# Patient Record
Sex: FEMALE | Race: WHITE | HISPANIC OR LATINO | Employment: FULL TIME | URBAN - METROPOLITAN AREA
[De-identification: names, ages, dates, MRNs, and addresses within clinical notes are randomized per-mention and may not be internally consistent; named-entity substitution may affect disease eponyms.]

---

## 2019-05-09 ENCOUNTER — OFFICE VISIT (OUTPATIENT)
Dept: INTERNAL MEDICINE | Facility: CLINIC | Age: 34
End: 2019-05-09
Payer: COMMERCIAL

## 2019-05-09 VITALS
BODY MASS INDEX: 38.95 KG/M2 | OXYGEN SATURATION: 98 % | HEART RATE: 73 BPM | TEMPERATURE: 98.6 F | DIASTOLIC BLOOD PRESSURE: 78 MMHG | WEIGHT: 193.2 LBS | RESPIRATION RATE: 16 BRPM | SYSTOLIC BLOOD PRESSURE: 116 MMHG | HEIGHT: 59 IN

## 2019-05-09 DIAGNOSIS — Z13.220 LIPID SCREENING: ICD-10-CM

## 2019-05-09 DIAGNOSIS — N97.9 FEMALE INFERTILITY: ICD-10-CM

## 2019-05-09 DIAGNOSIS — Z00.00 ROUTINE HISTORY AND PHYSICAL EXAMINATION OF ADULT: ICD-10-CM

## 2019-05-09 DIAGNOSIS — Z80.3 FAMILY HISTORY OF MALIGNANT NEOPLASM OF BREAST: Primary | ICD-10-CM

## 2019-05-09 DIAGNOSIS — Z86.39 HISTORY OF THYROID DISEASE: ICD-10-CM

## 2019-05-09 DIAGNOSIS — Z13.1 SCREENING FOR DIABETES MELLITUS: ICD-10-CM

## 2019-05-09 DIAGNOSIS — E55.9 VITAMIN D DEFICIENCY: ICD-10-CM

## 2019-05-09 DIAGNOSIS — R53.83 FATIGUE, UNSPECIFIED TYPE: ICD-10-CM

## 2019-05-09 DIAGNOSIS — N92.6 IRREGULAR PERIODS: ICD-10-CM

## 2019-05-09 DIAGNOSIS — Z12.4 SCREENING FOR MALIGNANT NEOPLASM OF CERVIX: ICD-10-CM

## 2019-05-09 PROCEDURE — 99213 OFFICE O/P EST LOW 20 MIN: CPT | Mod: 25 | Performed by: PHYSICIAN ASSISTANT

## 2019-05-09 PROCEDURE — 99385 PREV VISIT NEW AGE 18-39: CPT | Performed by: PHYSICIAN ASSISTANT

## 2019-05-09 PROCEDURE — G0145 SCR C/V CYTO,THINLAYER,RESCR: HCPCS | Performed by: PHYSICIAN ASSISTANT

## 2019-05-09 PROCEDURE — 87624 HPV HI-RISK TYP POOLED RSLT: CPT | Performed by: PHYSICIAN ASSISTANT

## 2019-05-09 ASSESSMENT — MIFFLIN-ST. JEOR: SCORE: 1476.98

## 2019-05-09 NOTE — LETTER
May 17, 2019    Simran Zhou Anderson  [address] [facility] [address]    Dear Ms.Garcia Barron,  This letter is regarding your recent Pap smear (cervical cancer screening) and Human Papillomavirus (HPV) test.  We are happy to inform you that your Pap smear result is normal. Cervical cancer is closely linked with certain types of HPV. Your results showed no evidence of high-risk HPV.  Therefore we recommend you return in 5 years for your next pap smear and HPV test.  You will still need to return to the clinic every year for an annual exam and other preventive tests.  If you have additional questions regarding this result, please call our registered nurse, Apoorva at [phone].  Sincerely,    Giovanna Lance PA-C/jazmyne

## 2019-05-14 LAB
COPATH REPORT: NORMAL
PAP: NORMAL

## 2019-05-16 LAB
FINAL DIAGNOSIS: NORMAL
HPV HR 12 DNA CVX QL NAA+PROBE: NEGATIVE
HPV16 DNA SPEC QL NAA+PROBE: NEGATIVE
HPV18 DNA SPEC QL NAA+PROBE: NEGATIVE
SPECIMEN DESCRIPTION: NORMAL
SPECIMEN SOURCE CVX/VAG CYTO: NORMAL

## 2019-07-05 DIAGNOSIS — R53.83 FATIGUE, UNSPECIFIED TYPE: ICD-10-CM

## 2019-07-05 DIAGNOSIS — Z86.39 HISTORY OF THYROID DISEASE: ICD-10-CM

## 2019-07-05 LAB
ALBUMIN SERPL-MCNC: 3.5 G/DL (ref 3.4–5)
ALP SERPL-CCNC: 109 U/L (ref 40–150)
ALT SERPL W P-5'-P-CCNC: 26 U/L (ref 0–50)
ANION GAP SERPL CALCULATED.3IONS-SCNC: 7 MMOL/L (ref 3–14)
AST SERPL W P-5'-P-CCNC: 16 U/L (ref 0–45)
BASOPHILS # BLD AUTO: 0 10E9/L (ref 0–0.2)
BASOPHILS NFR BLD AUTO: 0.3 %
BILIRUB SERPL-MCNC: 0.2 MG/DL (ref 0.2–1.3)
BUN SERPL-MCNC: 11 MG/DL (ref 7–30)
CALCIUM SERPL-MCNC: 8.1 MG/DL (ref 8.5–10.1)
CHLORIDE SERPL-SCNC: 112 MMOL/L (ref 94–109)
CO2 SERPL-SCNC: 22 MMOL/L (ref 20–32)
CREAT SERPL-MCNC: 0.58 MG/DL (ref 0.52–1.04)
DEPRECATED CALCIDIOL+CALCIFEROL SERPL-MC: 18 UG/L (ref 20–75)
DIFFERENTIAL METHOD BLD: ABNORMAL
EOSINOPHIL # BLD AUTO: 0.2 10E9/L (ref 0–0.7)
EOSINOPHIL NFR BLD AUTO: 2.4 %
ERYTHROCYTE [DISTWIDTH] IN BLOOD BY AUTOMATED COUNT: 13.8 % (ref 10–15)
GFR SERPL CREATININE-BSD FRML MDRD: >90 ML/MIN/{1.73_M2}
GLUCOSE SERPL-MCNC: 88 MG/DL (ref 70–99)
HCT VFR BLD AUTO: 41.3 % (ref 35–47)
HGB BLD-MCNC: 13.6 G/DL (ref 11.7–15.7)
LYMPHOCYTES # BLD AUTO: 2.8 10E9/L (ref 0.8–5.3)
LYMPHOCYTES NFR BLD AUTO: 44.8 %
MCH RBC QN AUTO: 25.8 PG (ref 26.5–33)
MCHC RBC AUTO-ENTMCNC: 32.9 G/DL (ref 31.5–36.5)
MCV RBC AUTO: 78 FL (ref 78–100)
MONOCYTES # BLD AUTO: 0.6 10E9/L (ref 0–1.3)
MONOCYTES NFR BLD AUTO: 9.7 %
NEUTROPHILS # BLD AUTO: 2.7 10E9/L (ref 1.6–8.3)
NEUTROPHILS NFR BLD AUTO: 42.8 %
PLATELET # BLD AUTO: 249 10E9/L (ref 150–450)
POTASSIUM SERPL-SCNC: 4.1 MMOL/L (ref 3.4–5.3)
PROT SERPL-MCNC: 7.1 G/DL (ref 6.8–8.8)
RBC # BLD AUTO: 5.28 10E12/L (ref 3.8–5.2)
SODIUM SERPL-SCNC: 141 MMOL/L (ref 133–144)
TSH SERPL DL<=0.005 MIU/L-ACNC: 0.42 MU/L (ref 0.4–4)
WBC # BLD AUTO: 6.3 10E9/L (ref 4–11)

## 2019-07-05 PROCEDURE — 36415 COLL VENOUS BLD VENIPUNCTURE: CPT | Performed by: PHYSICIAN ASSISTANT

## 2019-07-05 PROCEDURE — 82306 VITAMIN D 25 HYDROXY: CPT | Performed by: PHYSICIAN ASSISTANT

## 2019-07-05 PROCEDURE — 85025 COMPLETE CBC W/AUTO DIFF WBC: CPT | Performed by: PHYSICIAN ASSISTANT

## 2019-07-05 PROCEDURE — 84443 ASSAY THYROID STIM HORMONE: CPT | Performed by: PHYSICIAN ASSISTANT

## 2019-07-05 PROCEDURE — 80053 COMPREHEN METABOLIC PANEL: CPT | Performed by: PHYSICIAN ASSISTANT

## 2019-07-09 RX ORDER — ERGOCALCIFEROL 1.25 MG/1
50000 CAPSULE, LIQUID FILLED ORAL WEEKLY
Qty: 8 CAPSULE | Refills: 0 | Status: SHIPPED | OUTPATIENT
Start: 2019-07-09 | End: 2019-08-28

## 2019-07-25 ENCOUNTER — OFFICE VISIT (OUTPATIENT)
Dept: INTERNAL MEDICINE | Facility: CLINIC | Age: 34
End: 2019-07-25
Payer: COMMERCIAL

## 2019-07-25 VITALS
TEMPERATURE: 98.7 F | SYSTOLIC BLOOD PRESSURE: 112 MMHG | BODY MASS INDEX: 39.26 KG/M2 | WEIGHT: 194.4 LBS | OXYGEN SATURATION: 96 % | DIASTOLIC BLOOD PRESSURE: 82 MMHG | RESPIRATION RATE: 18 BRPM | HEART RATE: 74 BPM

## 2019-07-25 DIAGNOSIS — R89.9 ABNORMAL LABORATORY TEST RESULT: Primary | ICD-10-CM

## 2019-07-25 DIAGNOSIS — Z30.09 FAMILY PLANNING: ICD-10-CM

## 2019-07-25 PROCEDURE — 99213 OFFICE O/P EST LOW 20 MIN: CPT | Performed by: PHYSICIAN ASSISTANT

## 2019-07-25 RX ORDER — PRENATAL VIT/IRON FUM/FOLIC AC 27MG-0.8MG
1 TABLET ORAL DAILY
Qty: 90 TABLET | Refills: 3 | Status: SHIPPED | OUTPATIENT
Start: 2019-07-25 | End: 2020-12-04

## 2019-07-25 NOTE — PROGRESS NOTES
Subjective     Simran Barron is a 33 year old female who presents to clinic today for the following health issues:    HPI     Patient is here today to go over results with provider from last visit. Labs were drawn during physical.      Reviewed and updated as needed this visit by Provider  Meds  Problems             Objective    /82   Pulse 74   Temp 98.7  F (37.1  C) (Oral)   Resp 18   Wt 88.2 kg (194 lb 6.4 oz)   LMP 06/08/2019 (Approximate)   SpO2 96%   BMI 39.26 kg/m    Body mass index is 39.26 kg/m .  Physical Exam   GENERAL: healthy, alert and no distress    Diagnostic Test Results:  Labs reviewed in Epic  Results for orders placed or performed in visit on 07/05/19   CBC with platelets and differential   Result Value Ref Range    WBC 6.3 4.0 - 11.0 10e9/L    RBC Count 5.28 (H) 3.8 - 5.2 10e12/L    Hemoglobin 13.6 11.7 - 15.7 g/dL    Hematocrit 41.3 35.0 - 47.0 %    MCV 78 78 - 100 fl    MCH 25.8 (L) 26.5 - 33.0 pg    MCHC 32.9 31.5 - 36.5 g/dL    RDW 13.8 10.0 - 15.0 %    Platelet Count 249 150 - 450 10e9/L    % Neutrophils 42.8 %    % Lymphocytes 44.8 %    % Monocytes 9.7 %    % Eosinophils 2.4 %    % Basophils 0.3 %    Absolute Neutrophil 2.7 1.6 - 8.3 10e9/L    Absolute Lymphocytes 2.8 0.8 - 5.3 10e9/L    Absolute Monocytes 0.6 0.0 - 1.3 10e9/L    Absolute Eosinophils 0.2 0.0 - 0.7 10e9/L    Absolute Basophils 0.0 0.0 - 0.2 10e9/L    Diff Method Automated Method    Vitamin D Deficiency   Result Value Ref Range    Vitamin D Deficiency screening 18 (L) 20 - 75 ug/L   Comprehensive metabolic panel   Result Value Ref Range    Sodium 141 133 - 144 mmol/L    Potassium 4.1 3.4 - 5.3 mmol/L    Chloride 112 (H) 94 - 109 mmol/L    Carbon Dioxide 22 20 - 32 mmol/L    Anion Gap 7 3 - 14 mmol/L    Glucose 88 70 - 99 mg/dL    Urea Nitrogen 11 7 - 30 mg/dL    Creatinine 0.58 0.52 - 1.04 mg/dL    GFR Estimate >90 >60 mL/min/[1.73_m2]    GFR Estimate If Black >90 >60 mL/min/[1.73_m2]    Calcium 8.1  (L) 8.5 - 10.1 mg/dL    Bilirubin Total 0.2 0.2 - 1.3 mg/dL    Albumin 3.5 3.4 - 5.0 g/dL    Protein Total 7.1 6.8 - 8.8 g/dL    Alkaline Phosphatase 109 40 - 150 U/L    ALT 26 0 - 50 U/L    AST 16 0 - 45 U/L   **TSH with free T4 reflex FUTURE anytime   Result Value Ref Range    TSH 0.42 0.40 - 4.00 mU/L           Assessment & Plan     1. Abnormal laboratory test result  - reviewed lab results with patient   - vitamin D previously called in for treatment, pt to   - pt did initially ask about safety if she is pregnant, provider had thought this was safe, after review on Uptodate looks like recommendation is take [id number] international unit(s) during pregnancy, provider called pharmacy staff who agreed to review this with patient when she picks up rx     2. Family planning  - pt is unsure if she pregnant planning on checking when she gets home, prenatal prescribed   - Prenatal Vit-Fe Fumarate-FA (PRENATAL MULTIVITAMIN W/IRON) 27-0.8 MG tablet; Take 1 tablet by mouth daily  Dispense: 90 tablet; Refill: 3     No follow-ups on file.    Giovanna Lance PA-C  [facility]

## 2020-06-19 ENCOUNTER — APPOINTMENT (OUTPATIENT)
Dept: GENERAL RADIOLOGY | Facility: CLINIC | Age: 35
End: 2020-06-19
Attending: EMERGENCY MEDICINE
Payer: OTHER MISCELLANEOUS

## 2020-06-19 ENCOUNTER — HOSPITAL ENCOUNTER (EMERGENCY)
Facility: CLINIC | Age: 35
Discharge: HOME | End: 2020-06-19
Attending: EMERGENCY MEDICINE | Admitting: EMERGENCY MEDICINE
Payer: OTHER MISCELLANEOUS

## 2020-06-19 VITALS
SYSTOLIC BLOOD PRESSURE: 108 MMHG | DIASTOLIC BLOOD PRESSURE: 49 MMHG | HEIGHT: 60 IN | RESPIRATION RATE: 20 BRPM | OXYGEN SATURATION: 96 % | WEIGHT: 185 LBS | BODY MASS INDEX: 36.32 KG/M2 | TEMPERATURE: 98.5 F

## 2020-06-19 DIAGNOSIS — S40.012A CONTUSION OF LEFT SHOULDER, INITIAL ENCOUNTER: ICD-10-CM

## 2020-06-19 DIAGNOSIS — S39.012A BACK STRAIN, INITIAL ENCOUNTER: ICD-10-CM

## 2020-06-19 PROCEDURE — 73030 X-RAY EXAM OF SHOULDER: CPT | Mod: LT

## 2020-06-19 PROCEDURE — 99284 EMERGENCY DEPT VISIT MOD MDM: CPT

## 2020-06-19 PROCEDURE — 72100 X-RAY EXAM L-S SPINE 2/3 VWS: CPT

## 2020-06-19 RX ORDER — IBUPROFEN 200 MG
400 TABLET ORAL EVERY 6 HOURS PRN
Qty: 60 TABLET | Refills: 0 | COMMUNITY
Start: 2020-06-19 | End: 2020-12-04

## 2020-06-19 ASSESSMENT — ENCOUNTER SYMPTOMS
WEAKNESS: 0
DYSURIA: 0
CHILLS: 0
BACK PAIN: 1
DIFFICULTY URINATING: 0
FEVER: 0

## 2020-06-19 ASSESSMENT — MIFFLIN-ST. JEOR: SCORE: 1450.65

## 2020-06-19 NOTE — ED PROVIDER NOTES
History     Chief Complaint:  Back Pain       HPI   Simran Barron is a 34 year old female who presents with low back pain, and left-sided shoulder pain.  The patient works as a  provider.  About a week ago, she was feeding another child, when she realized that the baby was crawling behind her.  She stepped awkwardly, and fell with her entire weight onto her left shoulder and left back.  Initially, she had minimal pain however over the last week, she is noted increased pain especially at night and when walking.  The pain in her shoulder is not worse with movement.  She has not noted any weakness or numbness in her left arm.  Pain in her back is left-sided, and occasionally shoots down the left leg but not past the knee.  She sometimes feels paresthesias in the left thigh, but no weakness.  The pain in the back is worse with ambulation.  She denies any difficulty urinating, painful urination, fevers or chills, or previous history of back injury.    Allergies:  No Known Allergies     Medications:    Medications reviewed. No current medications.      Past Medical History:    Medical history reviewed. No pertinent medical history.     Past Surgical History:    Surgical pathology exam     Family History:    Liver cancer  Diabetes   Breast cancer     Social History:  Smoking Status: Never Smoker  Smokeless Tobacco: Never Used  Alcohol Use: No  Drug Use: No  PCP: Giovanna Florian       Review of Systems   Constitutional: Negative for chills and fever.   Genitourinary: Negative for difficulty urinating and dysuria.   Musculoskeletal: Positive for back pain (low back).        Left shoulder pain   Neurological: Negative for weakness.        Paresthesias in left thigh   All other systems reviewed and are negative.        Physical Exam     Patient Vitals for the past 24 hrs:   BP Temp Temp src Heart Rate Resp SpO2 Height Weight   06/19/20 0954 108/49 98.5  F (36.9  C) Oral 65 20 96 % 1.524 m (5') 83.9  kg (185 lb)        Physical Exam  General: Pleasant female, lying on the gurney in no distress  HENT: mucous membranes moist  CV: regular rate, regular rhythm  Resp: normal effort, clear throughout, no crackles or wheezing  GI: abdomen soft and nontender, no guarding  MSK: no bony tenderness.  Posterior left scapula tender to palpation, no crepitus or bruising.  Full range of motion active and passive in the left shoulder.  Bilateral upper extremity 5 out of 5  strength, finger extension, triceps, biceps, deltoids.  Fine touch sensation intact bilateral upper extremities.5/5 strength in hip flexors, knee extensors, dorsiflexion, plantarflexion, EHL, FHL.  Fine touch sensation intact throughout bilateral lower extremities.  Hyporeflexic at patella tendon bilaterally.    Skin: appropriately warm and dry  Neuro: alert, clear speech, oriented.  Normal ambulation.  Psych: normal mood and affect      Emergency Department Course     Imaging:  Radiology findings were communicated with the patient who voiced understanding of the findings.    Lumbar spine XR, 2-3 views  Preliminary Result  IMPRESSION: There are five nonrib-bearing lumbar vertebral bodies.  Mild levoconvex curvature centered at L3-L4. Sagittal alignment is  normal. No gross vertebral body height loss. The intervertebral disc  spaces are grossly maintained. If there is continued concern for  radiographically occult acute traumatic injury, consider  cross-sectional imaging, as radiographs are insensitive.  Reading per radiology       XR Shoulder Left G/E 3 Views  Preliminary Result  IMPRESSION: Unremarkable exam.  Reading per radiology     Emergency Department Course:  Past medical records, nursing notes, and vitals reviewed.    1002 I performed an exam of the patient as documented above.    The patient was sent for imaging while in the emergency department, results above.       1130 Patient rechecked and updated.       Findings and plan explained to the  Patient. Patient discharged home with instructions regarding supportive care, medications, and reasons to return. The importance of close follow-up was reviewed. The patient was prescribed ibuprofen.       Impression & Plan     Medical Decision Making:  Simran Barron is a 34 year old healthy female who presents to the emergency department today with shoulder pain and low back pain after a mechanical fall about a week ago.  On exam, the patient has minimal tenderness to posterior left shoulder, and is neurovascularly intact.  Radiographs of that extremity are negative for fracture.  She also complains of low back pain, though symptoms and exam were not consistent with sciatica.  She is neurologically intact.  I am not concerned for pathologic fracture, discitis, osteomyelitis, epidural abscess, epidural hematoma, abdominal aortic aneurysm, or other dangerous cause of symptoms.  Given recent fall, radiographs are obtained which fortunately are also negative for fracture.  I recommended continued treatment with ibuprofen, and follow-up with PCP for continued discomfort.  She will return to the ED if she develops numbness, weakness, worsening pain, fevers or chills, or further concerns.      Discharge Diagnosis:    ICD-10-CM    1. Back strain, initial encounter  S39.012A    2. Contusion of left shoulder, initial encounter  S40.012A        Disposition:  Discharged to home.    Discharge Medications:  New Prescriptions    IBUPROFEN (ADVIL/MOTRIN) 200 MG TABLET    Take 2 tablets (400 mg) by mouth every 6 hours as needed for pain or moderate pain       Scribe Disclosure:  LAURA Rubens Vilao, am serving as a scribe at 10:02 AM on 6/19/2020 to document services personally performed by Shamika Samuels MD based on my observations and the provider's statements to me.      6/19/2020   Shamika Samuels MD Pepper, Tracy Lynn, MD  06/19/20 7043

## 2020-06-19 NOTE — ED AVS SNAPSHOT
Emergency Department  [address] [place] [id number]  Phone:  [phone]  Fax:  [phone]                                    Simran Barron   MRN: [id number]    Department:   Emergency Department   Date of Visit:  6/19/2020           After Visit Summary Signature Page    I have received my discharge instructions, and my questions have been answered. I have discussed any challenges I see with this plan with the nurse or doctor.    ..........................................................................................................................................  Patient/Patient Representative Signature      ..........................................................................................................................................  Patient Representative Print Name and Relationship to Patient    ..................................................               ................................................  Date                                   Time    ..........................................................................................................................................  Reviewed by Signature/Title    ...................................................              ..............................................  Date                                               Time          22EPIC Rev 08/18

## 2020-10-30 ENCOUNTER — OFFICE VISIT (OUTPATIENT)
Dept: INTERNAL MEDICINE | Facility: CLINIC | Age: 35
End: 2020-10-30
Payer: COMMERCIAL

## 2020-10-30 VITALS
WEIGHT: 202 LBS | HEART RATE: 57 BPM | OXYGEN SATURATION: 97 % | RESPIRATION RATE: 16 BRPM | BODY MASS INDEX: 39.66 KG/M2 | SYSTOLIC BLOOD PRESSURE: 120 MMHG | HEIGHT: 60 IN | DIASTOLIC BLOOD PRESSURE: 78 MMHG | TEMPERATURE: 97.8 F

## 2020-10-30 DIAGNOSIS — E55.9 VITAMIN D DEFICIENCY: ICD-10-CM

## 2020-10-30 DIAGNOSIS — Z80.3 FH: BREAST CANCER IN FIRST DEGREE RELATIVE: ICD-10-CM

## 2020-10-30 DIAGNOSIS — N92.1 MENORRHAGIA WITH IRREGULAR CYCLE: ICD-10-CM

## 2020-10-30 DIAGNOSIS — R79.89 ABNORMAL THYROID STIMULATING HORMONE LEVEL: ICD-10-CM

## 2020-10-30 DIAGNOSIS — Z00.00 ENCOUNTER FOR ROUTINE ADULT HEALTH EXAMINATION WITHOUT ABNORMAL FINDINGS: Primary | ICD-10-CM

## 2020-10-30 LAB
ERYTHROCYTE [DISTWIDTH] IN BLOOD BY AUTOMATED COUNT: 14.1 % (ref 10–15)
HCT VFR BLD AUTO: 42 % (ref 35–47)
HGB BLD-MCNC: 13.6 G/DL (ref 11.7–15.7)
MCH RBC QN AUTO: 25.6 PG (ref 26.5–33)
MCHC RBC AUTO-ENTMCNC: 32.4 G/DL (ref 31.5–36.5)
MCV RBC AUTO: 79 FL (ref 78–100)
PLATELET # BLD AUTO: 285 10E9/L (ref 150–450)
RBC # BLD AUTO: 5.32 10E12/L (ref 3.8–5.2)
TSH SERPL DL<=0.005 MIU/L-ACNC: 0.61 MU/L (ref 0.4–4)
WBC # BLD AUTO: 6.2 10E9/L (ref 4–11)

## 2020-10-30 PROCEDURE — 36415 COLL VENOUS BLD VENIPUNCTURE: CPT | Performed by: INTERNAL MEDICINE

## 2020-10-30 PROCEDURE — 99395 PREV VISIT EST AGE 18-39: CPT | Performed by: INTERNAL MEDICINE

## 2020-10-30 PROCEDURE — 82306 VITAMIN D 25 HYDROXY: CPT | Performed by: INTERNAL MEDICINE

## 2020-10-30 PROCEDURE — 99214 OFFICE O/P EST MOD 30 MIN: CPT | Mod: 25 | Performed by: INTERNAL MEDICINE

## 2020-10-30 PROCEDURE — 84443 ASSAY THYROID STIM HORMONE: CPT | Performed by: INTERNAL MEDICINE

## 2020-10-30 PROCEDURE — 85027 COMPLETE CBC AUTOMATED: CPT | Performed by: INTERNAL MEDICINE

## 2020-10-30 ASSESSMENT — MIFFLIN-ST. JEOR: SCORE: 1527.77

## 2020-10-30 NOTE — PROGRESS NOTES
"   SUBJECTIVE:   CC: Simran Barron is an 34 year old woman who presents for preventive health visit.   Healthy Habits:     Getting at least 3 servings of Calcium per day:  Yes    Bi-annual eye exam:  Yes    Dental care twice a year:  Yes    Sleep apnea or symptoms of sleep apnea:  None    Diet:  Regular (no restrictions)    Frequency of exercise:  None    Duration of exercise:  N/A    Taking medications regularly:  Not Applicable    Barriers to taking medications:  Not applicable    Medication side effects:  Not applicable    PHQ-2 Total Score: 0    Additional concerns today:  Yes (Irregular Periods)    Simran presents today for a physical and to address the following complaints. An  over the phone was used.    - Irregular periods: Simran has a long history of irregular periods.  She last saw OB/GYN a few years ago, reading their notes it appears they feel like this is most likely related to her obesity.  She also occasionally has quite heavy menses.  More recently, she reports very heavy menses from Sept 24 through Oct 3.  She states that had not happened in a while.  She is wondering if there is a pill I can give her to make her more regular.    - Subclinical hyperthyroidism: It looks like she saw endocrinology a few years ago for this.  She does have a history of low TSHs with normal free T4.  Last checked last year.  She denies any cold intolerance, heat intolerance, weight loss, diarrhea, constipation, headaches, or blurry vision.  She does endorse some weight gain.    - Vitamin D deficiency: It does not sound like she took the vitamin D supplementation she was prescribed last year.    - FH of breast cancer: Upon further probing of her family history, it sounds like she has had 1 sister passed away from what she calls a stomach cancer and she tells me they did not figure out what type of cancer it was.  She states to me \"her stomach filled up with cancer\".  She then tells me that her other " sister has had breast cancer.    Today's PHQ-2 Score:   PHQ-2 ( 1999 [employer]) 10/30/2020   Q1: Little interest or pleasure in doing things 0   Q2: Feeling down, depressed or hopeless 0   PHQ-2 Score 0   Q1: Little interest or pleasure in doing things -   Q2: Feeling down, depressed or hopeless -   PHQ-2 Score -     Abuse: Current or Past (Physical, Sexual or Emotional) - No  Do you feel safe in your environment? Yes    Social History     Tobacco Use     Smoking status: Never Smoker     Smokeless tobacco: Never Used   Substance Use Topics     Alcohol use: No     If you drink alcohol do you typically have >3 drinks per day or >7 drinks per week? No    Alcohol Use 10/30/2020   Prescreen: >3 drinks/day or >7 drinks/week? -   Prescreen: >3 drinks/day or >7 drinks/week? No     Reviewed orders with patient.  Reviewed health maintenance and updated orders accordingly - Yes    Labs reviewed in EPIC    Mammogram not appropriate for this patient based on age.    Pertinent mammograms are reviewed under the imaging tab.  History of abnormal Pap smear: NO - age 30-65 PAP every 5 years with negative HPV co-testing recommended  PAP / HPV Latest Ref Rng & Units 5/9/2019   PAP - NIL   HPV 16 DNA NEG:Negative Negative   HPV 18 DNA NEG:Negative Negative   OTHER HR HPV NEG:Negative Negative     Reviewed and updated as needed this visit by clinical staff  Tobacco  Allergies  Meds  Problems  Med Hx  Surg Hx  Fam Hx        Reviewed and updated as needed this visit by Provider  Tobacco  Allergies  Meds  Problems  Med Hx  Surg Hx  Fam Hx           Review of Systems  CONSTITUTIONAL: NEGATIVE for fever, chills. Positive for change in weight  INTEGUMENTARU/SKIN: NEGATIVE for worrisome rashes, moles or lesions  EYES: NEGATIVE for vision changes or irritation  ENT: NEGATIVE for ear, mouth and throat problems  RESP: NEGATIVE for significant cough or SOB  BREAST: NEGATIVE for masses, tenderness or discharge  CV: NEGATIVE for chest  pain, palpitations or peripheral edema  GI: NEGATIVE for nausea, abdominal pain, heartburn, or change in bowel habits  : NEGATIVE for unusual urinary or vaginal symptoms. Periods are irregular.  MUSCULOSKELETAL: NEGATIVE for significant arthralgias or myalgia  NEURO: NEGATIVE for weakness, dizziness or paresthesias  PSYCHIATRIC: NEGATIVE for changes in mood or affect     OBJECTIVE:   /78   Pulse 57   Temp 97.8  F (36.6  C) (Temporal)   Resp 16   Ht 1.524 m (5')   Wt 91.6 kg (202 lb)   SpO2 97%   BMI 39.45 kg/m       Physical Exam  GENERAL alert and in no distress.  EYES conjunctivae/corneas clear. PERRL. EOMs grossly intact  HENT: NC/AT, facies symmetric  NECK: Neck supple. No LAD, without thyroidmegaly or JVD.  LYMPH: no cervical LAD appreciated  RESP: CTAB. No w/r/r.  CV: RRR, no m/r/g.  GI: Obese. NT, ND, no organomegaly, without rebound or guarding  MSK: No cyanosis, clubbing or edema noted bilaterally in upper and/or lower extremities  SKIN: no significant ulcers, lesions or rashes on the visualized portions of the skin  NEURO: Alert. Oriented. Gait normal. Sensation grossly WNL.  PSYCH: Linear thought process. Speech normal rate and volume; able to articulate logical thoughts, able to abstract reason. No tangential thoughts, hallucinations, or delusions.    Diagnostic Test Results: Labs reviewed in [facility]    ASSESSMENT/PLAN:   1. Encounter for routine adult health examination without abnormal findings  Discussed cardiac risk factor modification, including screening for (and treating if present) high cholesterol, HTN, DM, and avoiding smoking.  Recommended a healthy, plant-focused diet as well as regular aerobic activity. Discussed schedule for mammogram and importance of regular pelvic exams and PAP smears to check for GYN malignancies.  If age appropriate, discussed colorectal cancer screening.  - REVIEW OF HEALTH MAINTENANCE PROTOCOL ORDERS    2. Menorrhagia with irregular cycle  This does  appear and sound like it has been a longstanding issue.  However, I am concerned with the amount of blood she said she lost during the 2 weeks of her heavy menses earlier this month.  I strongly, strongly encouraged her to reestablish with OB/GYN.  It does look like this was discussed at last years physical but she tells me today that she did not do that.  I do think that she would benefit from ongoing gynecologic care.  - CBC with platelets  - OB/GYN REFERRAL    3. Abnormal thyroid stimulating hormone level  It looks like she saw endocrinology a few years ago for this.  She does have a history of low TSHs with normal free T4.  Last checked last year.  Denies any current symptoms, with the exception of irregular menses.  - TSH with free T4 reflex    4. FH: breast cancer in first degree relative  I am quite concerned about Simran's family history.  It does sound to me like she is describing omental caking which can be seen with ovarian cancer in her sister that passed away.  Also has a positive history of breast cancer in her other sister.  I took some time today to discuss the BRCA gene with Simran, and the importance of screening for it in folks with a high risk family history.  I discussed a genetic counselor referral with Simran today.  Regardless, I think she does need regular gynecologic care and I do think she would benefit from testing for the BRCA mutation.  - CANCER RISK MGMT/CANCER GENETIC COUNSELING REFERRAL  - OB/GYN REFERRAL    5. Vitamin D deficiency  Found last year.  Does not sound like she completed repletion treatment.  - Vitamin D Deficiency    Patient has been advised of split billing requirements and indicates understanding: Yes  COUNSELING:  Reviewed preventive health counseling, as reflected in patient instructions       Regular exercise       Healthy diet/nutrition    Estimated body mass index is 39.45 kg/m  as calculated from the following:    Height as of this encounter: 1.524 m (5').    Weight  as of this encounter: 91.6 kg (202 lb).    Weight management plan: Discussed healthy diet and exercise guidelines    She reports that she has never smoked. She has never used smokeless tobacco.      Counseling Resources:  ATP IV Guidelines  Pooled Cohorts Equation Calculator  Breast Cancer Risk Calculator  BRCA-Related Cancer Risk Assessment: FHS-7 Tool  FRAX Risk Assessment  ICSI Preventive Guidelines  Dietary Guidelines for Americans, 2010  USDA's MyPlate  ASA Prophylaxis  Lung CA Screening    Trevon Corral MD  [facility]

## 2020-11-02 ENCOUNTER — APPOINTMENT (OUTPATIENT)
Dept: INTERPRETER SERVICES | Facility: CLINIC | Age: 35
End: 2020-11-02
Payer: COMMERCIAL

## 2020-11-02 ENCOUNTER — PRE VISIT (OUTPATIENT)
Dept: ONCOLOGY | Facility: CLINIC | Age: 35
End: 2020-11-02

## 2020-11-02 LAB — DEPRECATED CALCIDIOL+CALCIFEROL SERPL-MC: 18 UG/L (ref 20–75)

## 2020-11-02 RX ORDER — ERGOCALCIFEROL 1.25 MG/1
1 CAPSULE, LIQUID FILLED ORAL WEEKLY
Qty: 8 CAPSULE | Refills: 0 | Status: SHIPPED | OUTPATIENT
Start: 2020-11-02 | End: 2021-06-20

## 2020-11-02 NOTE — TELEPHONE ENCOUNTER
ONCOLOGY INTAKE: Records Information      APPT INFORMATION:  Referring provider:  Dr. Royal  Referring provider s clinic:  [facility]  Reason for visit/diagnosis:  family history of breast cancer  Has patient been notified of appointment date and time?: yes    RECORDS INFORMATION:  Were the records received with the referral (via [employer])? no    Has patient been seen for any external appt for this diagnosis? no    If yes, where? n/a    Has patient had any imaging or procedures outside of [facility] for this condition? no      If Yes, where? n/a    ADDITIONAL INFORMATION:  none

## 2020-11-03 ENCOUNTER — OFFICE VISIT (OUTPATIENT)
Dept: OBGYN | Facility: CLINIC | Age: 35
End: 2020-11-03
Payer: COMMERCIAL

## 2020-11-03 VITALS — WEIGHT: 202 LBS | DIASTOLIC BLOOD PRESSURE: 70 MMHG | BODY MASS INDEX: 39.45 KG/M2 | SYSTOLIC BLOOD PRESSURE: 116 MMHG

## 2020-11-03 DIAGNOSIS — N92.6 IRREGULAR MENSES: Primary | ICD-10-CM

## 2020-11-03 DIAGNOSIS — Z80.3 FH: BREAST CANCER IN FIRST DEGREE RELATIVE: ICD-10-CM

## 2020-11-03 LAB
ESTRADIOL SERPL-MCNC: 29 PG/ML
FSH SERPL-ACNC: 8.6 IU/L
PROLACTIN SERPL-MCNC: 5 UG/L (ref 3–27)

## 2020-11-03 PROCEDURE — 82670 ASSAY OF TOTAL ESTRADIOL: CPT | Performed by: ADVANCED PRACTICE MIDWIFE

## 2020-11-03 PROCEDURE — 84702 CHORIONIC GONADOTROPIN TEST: CPT | Performed by: ADVANCED PRACTICE MIDWIFE

## 2020-11-03 PROCEDURE — 84146 ASSAY OF PROLACTIN: CPT | Performed by: ADVANCED PRACTICE MIDWIFE

## 2020-11-03 PROCEDURE — 99203 OFFICE O/P NEW LOW 30 MIN: CPT | Performed by: ADVANCED PRACTICE MIDWIFE

## 2020-11-03 PROCEDURE — 83001 ASSAY OF GONADOTROPIN (FSH): CPT | Performed by: ADVANCED PRACTICE MIDWIFE

## 2020-11-03 NOTE — PROGRESS NOTES
Midwife Dysfunctional Uterine Bleeding Note    Date: 11/3/2020    CC:  Abnormal Uterine Bleeding    HPI:  Simran Barron is a 34 year old female  presents for evaluation of abnormal uterine bleeding as a referral from Giovanna Florian.  Pt reports she has had irregular menses for her entire life. States menses have become significantly more irregular since the birth of her son 7 years ago. Since then cycles have ranged in length from 28 days to over a year without a period. Periods will be heavy at times and then occasionally light spotting. She has been trying for a pregnancy since the birth of her son and has been unable to conceive, has not been tracking ovulation. Period from -10/2 was 2 weeks long, and heavy enough that she had to wear diapers to control the bleeding. Most recent period from 10/24 until  has bee light spotting. Was seen for an evaluation of oligomenorrhea in  and had a prolactin and TSH done at that time, it appears based on notes that irregular menses was attributed mainly to weight gain. She was ordered to do a progesterone withdrawal challenge and then start oral contraceptives to regulate her periods but did not do either of these and did not follow-up afterwards. Pt does note a 60lb weight gain since the birth of her son as well as cold/heat intolerance. Denies any male pattern hair growth or acne. Has known history of subclinical hyperthyroidism with normal T4, most recent TSH 10/28 was normal. Of note, patient has a family history of breast cancer (sister) and possible ovarian cancer, per note from PCP from 10/28, pt somewhat unclear about hx of ovarian cancer. She does have an appointment with a genetic counselor scheduled for  with possible BRCA testing.     Duration of bleeding problem: many years  Frequency of bleeding: very irregular  day cycles  Flow: sometimes heavy, other times will be only spotting, changes a pad every 1 hours at the  heaviest  Breakthrough bleeding: no  Post-coital bleeding: no  Pelvic pain: reports cramping, pelvic pain with periods, none while not having a period    Her work-up thus far for her abnormal bleeding has included:  - TSH: normal on 10/28  - Last pap:  NIL, no hx of abnormals  -STD screen: no concerns, low risk    Patient has tried to following treatments: .     GYN HISTORY:  Patient's last menstrual period was 10/24/2020.  Menopause: no  Menses: occurs irregularly lasting 5 - 14 days in duration.   STI history: No STD history  History of abnormal pap: No history of abnormal pap  History of cervical procedures: none   Contraceptive History: none since birth of her son in   The patient is sexually active with male partners.       Patient has following risk factors for endometrial cancer:  - Unopposed estrogen exposure: No  - Obesity: Yes. Details: BMI 39.4  - Smoking: No  - Nulliparity: No  - Infertility: Yes. Details: TTC For 7 years without success   - Early age of menarche / late age of menopause: No  - Family history of colon cancer, ovarian cancer, and type I endometrial cancer: Yes. Details:     OBSTETRIC HISTORY:   OB History    Para Term  AB Living   1 0 0 0 0 1   SAB TAB Ectopic Multiple Live Births   0 0 0 0 0      # Outcome Date GA Lbr Josiah/2nd Weight Sex Delivery Anes PTL Lv   1                 No past medical history on file.    Past Surgical History:   Procedure Laterality Date     SURGICAL PATHOLOGY EXAM         Family History   Problem Relation Age of Onset     Diabetes Other      Cancer Paternal Grandfather      Liver Cancer Father      Diabetes Mother      Breast Cancer Sister 47     There is family history of uterine, ovarian, breast, colon cancer.     Current Outpatient Medications   Medication Sig Dispense Refill     ibuprofen (ADVIL/MOTRIN) 200 MG tablet Take 2 tablets (400 mg) by mouth every 6 hours as needed for pain or moderate pain (Patient not taking: Reported on  10/30/2020) 60 tablet 0     Prenatal Vit-Fe Fumarate-FA (PRENATAL MULTIVITAMIN W/IRON) 27-0.8 MG tablet Take 1 tablet by mouth daily (Patient not taking: Reported on 10/30/2020) 90 tablet 3     vitamin D2 (ERGOCALCIFEROL) 1.25 MG (56289 UT) capsule Take 1 capsule (50,000 Units) by mouth once a week 8 capsule 0       Allergies: Patient has no known allergies.    ROS:  C: NEGATIVE for fever, chills, change in weight  I: NEGATIVE for worrisome rashes, moles or lesions  E: NEGATIVE for vision changes or irritation  E/M: NEGATIVE for ear, mouth and throat problems  R: NEGATIVE for significant cough or SOB  CV: NEGATIVE for chest pain, palpitations or peripheral edema  GI: NEGATIVE for nausea, abdominal pain, heartburn, or change in bowel habits  : POSITIVE for abnormal bleeding as above, NEGATIVE for frequency, dysuria, hematuria, vaginal discharge  M: NEGATIVE for significant arthralgias or myalgia  N: NEGATIVE for weakness, dizziness or paresthesias  E: NEGATIVE for temperature intolerance, skin/hair changes  P: NEGATIVE for changes in mood or affect    EXAM:  Blood pressure 116/70, weight 91.6 kg (202 lb), last menstrual period 10/24/2020, unknown if currently breastfeeding.   BMI= Body mass index is 39.45 kg/m .  General - pleasant female in no acute distress.  Abdomen - soft, nontender  Pelvic - external genitalia: normal adult female without lesions or abnormalities   BUS: within normal limits  Vagina: well rugated, no discharge, no lesions  Cervix: no lesions or CMT  Uterus: Normal size, mobile and nontender  Adnexae: no masses or tenderness.  Rectovaginal - deferred.  Musculoskeletal - no gross deformities.  Neurological - normal strength, sensation, and mental status.      ASSESSMENT:  Simran Barron is a 34 year old  who presents with abnormal uterine bleeding. Discussed differential diagnosis of abnormal uterine bleeding. Plan to check labs and pelvic ultrasound as initial evaluation and  consider endometrial sampling given age >35 and significant hx of abnormal uterine bleeding. Await results from genetic counseling appointment regarding family history of breast cancer and plan for screening/followup accordingly. Consider MD referral in this office based on results of initial workup.      PLAN:  1. Irregular menses  - Testosterone Free and Total FUTURE anytime; Future  - Follicle stimulating hormone  - Estradiol  - Prolactin  - Progesterone; Future  - US Pelvic Complete with Transvaginal; Future  -CBC and TSH WNL on 10/28, not repeated today     Endometrial biopsy was not today to rule out hyperplasia and malignancy.  Transvaginal ultrasound: yes ordered  Cervical cancer screening: no, up to date    F/U:  1 month    YARELY Mcintyre CNM 11/3/2020 4:30 PM

## 2020-11-03 NOTE — NURSING NOTE
Chief Complaint   Patient presents with     Consult     painful and heavy periods, sometimes only has spotting       Initial /70 (BP Location: Right arm, Cuff Size: Adult Large)   Wt 91.6 kg (202 lb)   LMP 10/24/2020   BMI 39.45 kg/m   Estimated body mass index is 39.45 kg/m  as calculated from the following:    Height as of 10/30/20: 1.524 m (5').    Weight as of this encounter: 91.6 kg (202 lb).  BP completed using cuff size: large    Questioned patient about current smoking habits.  Pt. has never smoked.          The following HM Due: NONE    Yola Rosas, LATHA

## 2020-11-04 LAB — B-HCG SERPL-ACNC: <1 IU/L (ref 0–5)

## 2020-11-06 ENCOUNTER — ANCILLARY PROCEDURE (OUTPATIENT)
Dept: ULTRASOUND IMAGING | Facility: CLINIC | Age: 35
End: 2020-11-06
Attending: ADVANCED PRACTICE MIDWIFE
Payer: COMMERCIAL

## 2020-11-06 DIAGNOSIS — N92.6 IRREGULAR MENSES: ICD-10-CM

## 2020-11-06 PROCEDURE — 76830 TRANSVAGINAL US NON-OB: CPT | Performed by: FAMILY MEDICINE

## 2020-11-06 PROCEDURE — 76856 US EXAM PELVIC COMPLETE: CPT | Performed by: FAMILY MEDICINE

## 2020-11-19 ENCOUNTER — VIRTUAL VISIT (OUTPATIENT)
Dept: ONCOLOGY | Facility: CLINIC | Age: 35
End: 2020-11-19
Attending: INTERNAL MEDICINE
Payer: COMMERCIAL

## 2020-11-19 DIAGNOSIS — Z80.3 FAMILY HISTORY OF MALIGNANT NEOPLASM OF BREAST: Primary | ICD-10-CM

## 2020-11-19 DIAGNOSIS — Z80.0 FAMILY HISTORY OF STOMACH CANCER: ICD-10-CM

## 2020-11-19 PROCEDURE — 96040 HC GENETIC COUNSELING, EACH 30 MINUTES: CPT | Mod: TEL | Performed by: GENETIC COUNSELOR, MS

## 2020-11-19 NOTE — LETTER
"    Cancer Risk Management  Program [facility]  [facility]  [facility]  Mailing Address  Cancer Risk Management Program  [facility]  [address]  [address]  [place] [address]    New patient appointments  [phone]  November 25, 2020    Simran Barron  [address] [address]      Dear Simran,    It was a pleasure meeting with you on November 19, 2020. Here is a copy of the progress note from your recent genetic counseling visit to the Cancer Risk Management Program. If you have any additional questions, please feel free to call.    11/19/2020    Simran Barron is a 34 year old female who is being evaluated via a billable telephone visit.      The patient has been notified of following:     \"This telephone visit will be conducted via a call between you and your provider. We have found that certain health care needs can be provided without the need for a physical exam. This service lets us provide the care you need with a short phone conversation. If lab work is needed we can place an order for that and you can then stop by our lab to have the test done at a later time.    Telephone visits are billed at different rates depending on your insurance coverage. During this emergency period, for some insurers they may be billed the same as an in-person visit. Please reach out to your insurance provider with any questions.    If during the course of the call the provider feels a telephone visit is not appropriate, you will not be charged for this service.\"    Patient has given verbal consent for Telephone visit?  Yes  What phone number would you like to be contacted at? [phone]  How would you like to obtain your AVS? [facility]    Phone call duration: 45 minutes    Referring Provider: Giovanna Cherry" Casi DEXTER and Trevon Royal MD    Presenting Information:   Given concerns regarding the potential for COVID-19 exposure during a clinic visit, Simran elected for a telephone genetic counseling visit through the Cancer Risk Management Program to discuss her family history of breast, liver, and possibly stomach cancer. We reviewed this history, cancer screening recommendations, and available genetic testing options. A  was present by phone.    Personal History:  Simran is a 34 year old female. She does not have any personal history of cancer.    She had her first menstrual period at age 9, her first child at age 28, and is premenopausal. Simran has her ovaries, fallopian tubes and uterus in place. She had a transvaginal ultrasound in November 2020 to assess irregular bleeding which identified a polyp versus myoma and features suggestive of PCOS. She reports that she has never used hormone replacement therapy.      Simran has not had a mammogram or colonoscopy and does not regularly do any other cancer screening at this time.    Family History: (Please see scanned pedigree for detailed family history information)    Simran has five sisters and eleven brothers in their 30-50's.    One sister is 52 and was diagnosed with breast cancer at age 48.    One sister was diagnosed with and passed away from cancer in her stomach (primary site unknown due to significant metastasis) in her early 40's.    Simran's other siblings have not had a cancer.    Simran's mother is 78 and has never had a cancer.    Her four brothers and six sisters are in their 60-80's and have never had a cancer.    Her mother passed away at age 90 and never had a cancer.    Her father passed away at a young age and additional details are unknown.    Simran's father was diagnosed with liver cancer and passed away at age 71; he did not have a history of smoking or significant alcohol use.    He did not have any siblings.    No information is  available regarding his parents.    Her maternal and paternal families are from [facility]. There is no known Ashkenazi Restorationist ancestry on either side of her family.    Discussion:    Simran's family history of early-onset breast and possibly stomach cancer is suggestive of a hereditary cancer syndrome.    We reviewed the features of sporadic, familial, and hereditary cancers. In looking at Simran's family history, it is possible that a cancer susceptibility gene is present as she has two sisters diagnosed with potentially related cancers under the age of 50. That being said, Simran has multiple relatives, including other siblings, mother, aunts, uncles, and grandparents that have not been diagnosed with a cancer. Also, one of her sisters' exact cancer type is unknown. This likely reduces, but does not eliminate, the chance for a hereditary cancer syndrome in her family.    We discussed the natural history and genetics of hereditary breast and related caners.    For example, the most common cause of hereditary breast cancer is HBOC syndrome, which is caused by mutations in the BRCA1 and BRCA2 genes. Individuals with HBOC syndrome are at increased risk for several different cancers, including breast, ovarian, male breast, prostate, melanoma, and pancreatic cancer.    There are also additional genes that could cause increased risk for breast, stomach, and related cancers. As many of these genes present with overlapping features in a family and accurate cancer risk cannot always be established based upon the pedigree analysis alone, it is often reasonable to consider panel genetic testing to analyze multiple genes at once.    A detailed handout regarding these syndromes/genes and the information we discussed will be mailed to Simran and can be found in the after visit summary. Topics included: inheritance pattern, cancer risks, cancer screening recommendations, and also risks, benefits and limitations of testing.    We  discussed that genetic testing for cancer susceptibility genes is typically most informative, though, when it is first performed on a family member with a personal history of cancer. In this situation, we discussed that Simran's sister would be the best person to test first. Testing is available to Simran, but with limitations. If Simran pursues testing at this time and receives a negative result, this does not rule out the possibility of a hereditary cancer syndrome in her and/or her family.    Simran explained that she was unsure if her sister has pursued genetic testing, but would be able to ask her. She agreed to speak to her sister first regarding testing and will contact me with the updated information. If her sister has pursued genetic testing, we will then discuss the impact of those results on Simran. If she has not pursued genetic testing and/or is not interested in testing, we will readdress Simran's genetic testing options. Simran verbalized understanding.    Simran was provided my contact information, as well as the contact information for  Services. She denied having any other questions at this time.    Cancer screening recommendations based on Simran's current personal and family history:    Based on her personal and family history, Simran has a 13.2-19.6% lifetime risk of developing breast cancer based on the Mello and FRANCISCO 8 models, respectively. As such, if her estimated risk is rounded up, Simran would meet current National Comprehensive Cancer Network (NCCN) guidelines for high risk breast screening. This includes annual breast MRI in addition to annual mammogram beginning at age 38. In addition, Simran should be receiving clinical breast exams by her physician.     We discussed that Simran could participate in our Cancer Risk Management Program in which our clinical nurse specialist provides an individual screening plan and assists with medical management.     As this increased breast screening would not begin  for another two years, she felt comfortable declining a referral to meet with Saima at this time. She is encouraged to speak to either myself or her other medical providers when she is nearing age 36 to reevaluate these screening recommendations.    Simran s close female relatives remain at increased risk for breast cancer given their family history. Breast cancer screening is generally recommended to begin approximately 10 years younger than the earliest age of breast cancer diagnosis in the family, or at age 40, whichever comes first. In this family, screening may begin at age 38. Breast screening options should be discussed with an individual's primary care provider and a genetic counselor, to determine at what age to begin screening, what screening is appropriate, and if additional screening (such as breast MRI) is necessary based on personal/family history factors.    Other population cancer screening options, such as those recommended by the American Cancer Society and NCCN, are also appropriate for Simran and her family. These screening recommendations may change if there are changes to Simran's personal and/or family history of cancer. These screening recommendations may also change depending upon Simran's genetic testing results, if pursued. Final screening recommendations should be made by each individual's managing physician.    Plan:  1) Today Simran elected to speak to her sister with breast cancer first about genetic testing before proceeding with genetic testing herself.  2) Simran was provided my contact information and was encouraged to contact me with any questions, updates to the family history, and/or if she wishes to readdress her genetic testing options.  3) Cancer screening recommendations were reviewed for Simran and her close relatives based on the current family history. She was encouraged to contact me if she wishes to meet with DIMAS Lees to discuss increased breast screening.    Khloe  MS Domingo, [facility]  Licensed Genetic Counselor  Office: [phone]  Pager: [phone]

## 2020-11-19 NOTE — PROGRESS NOTES
"11/19/2020    Simran Barron is a 34 year old female who is being evaluated via a billable telephone visit.      The patient has been notified of following:     \"This telephone visit will be conducted via a call between you and your provider. We have found that certain health care needs can be provided without the need for a physical exam. This service lets us provide the care you need with a short phone conversation. If lab work is needed we can place an order for that and you can then stop by our lab to have the test done at a later time.    Telephone visits are billed at different rates depending on your insurance coverage. During this emergency period, for some insurers they may be billed the same as an in-person visit. Please reach out to your insurance provider with any questions.    If during the course of the call the provider feels a telephone visit is not appropriate, you will not be charged for this service.\"    Patient has given verbal consent for Telephone visit?  Yes  What phone number would you like to be contacted at? [phone]  How would you like to obtain your AVS? MyChart    Phone call duration: 45 minutes    Referring Provider: Giovanna Lance PA-C and Trevon Royal MD    Presenting Information:   Given concerns regarding the potential for COVID-19 exposure during a clinic visit, Simran elected for a telephone genetic counseling visit through the Cancer Risk Management Program to discuss her family history of breast, liver, and possibly stomach cancer. We reviewed this history, cancer screening recommendations, and available genetic testing options. A  was present by phone.    Personal History:  Simran is a 34 year old female. She does not have any personal history of cancer.    She had her first menstrual period at age 9, her first child at age 28, and is premenopausal. Simran has her ovaries, fallopian tubes and uterus in place. She had a transvaginal ultrasound " in November 2020 to assess irregular bleeding which identified a polyp versus myoma and features suggestive of PCOS. She reports that she has never used hormone replacement therapy.      Simran has not had a mammogram or colonoscopy and does not regularly do any other cancer screening at this time.    Family History: (Please see scanned pedigree for detailed family history information)    Simran has five sisters and eleven brothers in their 30-50's.    One sister is 52 and was diagnosed with breast cancer at age 48.    One sister was diagnosed with and passed away from cancer in her stomach (primary site unknown due to significant metastasis) in her early 40's.    Simran's other siblings have not had a cancer.    Simran's mother is 78 and has never had a cancer.    Her four brothers and six sisters are in their 60-80's and have never had a cancer.    Her mother passed away at age 90 and never had a cancer.    Her father passed away at a young age and additional details are unknown.    Simran's father was diagnosed with liver cancer and passed away at age 71; he did not have a history of smoking or significant alcohol use.    He did not have any siblings.    No information is available regarding his parents.    Her maternal and paternal families are from [facility]. There is no known Ashkenazi Jainism ancestry on either side of her family.    Discussion:    Simran's family history of early-onset breast and possibly stomach cancer is suggestive of a hereditary cancer syndrome.    We reviewed the features of sporadic, familial, and hereditary cancers. In looking at Simran's family history, it is possible that a cancer susceptibility gene is present as she has two sisters diagnosed with potentially related cancers under the age of 50. That being said, Simran has multiple relatives, including other siblings, mother, aunts, uncles, and grandparents that have not been diagnosed with a cancer. Also, one of her sisters' exact cancer type is  unknown. This likely reduces, but does not eliminate, the chance for a hereditary cancer syndrome in her family.    We discussed the natural history and genetics of hereditary breast and related caners.    For example, the most common cause of hereditary breast cancer is HBOC syndrome, which is caused by mutations in the BRCA1 and BRCA2 genes. Individuals with HBOC syndrome are at increased risk for several different cancers, including breast, ovarian, male breast, prostate, melanoma, and pancreatic cancer.    There are also additional genes that could cause increased risk for breast, stomach, and related cancers. As many of these genes present with overlapping features in a family and accurate cancer risk cannot always be established based upon the pedigree analysis alone, it is often reasonable to consider panel genetic testing to analyze multiple genes at once.    A detailed handout regarding these syndromes/genes and the information we discussed will be mailed to Simran and can be found in the after visit summary. Topics included: inheritance pattern, cancer risks, cancer screening recommendations, and also risks, benefits and limitations of testing.    We discussed that genetic testing for cancer susceptibility genes is typically most informative, though, when it is first performed on a family member with a personal history of cancer. In this situation, we discussed that Simran's sister would be the best person to test first. Testing is available to Simran, but with limitations. If Simran pursues testing at this time and receives a negative result, this does not rule out the possibility of a hereditary cancer syndrome in her and/or her family.    Simran explained that she was unsure if her sister has pursued genetic testing, but would be able to ask her. She agreed to speak to her sister first regarding testing and will contact me with the updated information. If her sister has pursued genetic testing, we will then  discuss the impact of those results on Simran. If she has not pursued genetic testing and/or is not interested in testing, we will readdress Simran's genetic testing options. Simran verbalized understanding.    Simran was provided my contact information, as well as the contact information for  Services. She denied having any other questions at this time.    Cancer screening recommendations based on Simran's current personal and family history:    Based on her personal and family history, Simran has a 13.2-19.6% lifetime risk of developing breast cancer based on the Mello and FRANCISCO 8 models, respectively. As such, if her estimated risk is rounded up, Simran would meet current National Comprehensive Cancer Network (NCCN) guidelines for high risk breast screening. This includes annual breast MRI in addition to annual mammogram beginning at age 38. In addition, Simran should be receiving clinical breast exams by her physician.     We discussed that Simran could participate in our Cancer Risk Management Program in which our clinical nurse specialist provides an individual screening plan and assists with medical management.     As this increased breast screening would not begin for another two years, she felt comfortable declining a referral to meet with Saima at this time. She is encouraged to speak to either myself or her other medical providers when she is nearing age 36 to reevaluate these screening recommendations.    Simran s close female relatives remain at increased risk for breast cancer given their family history. Breast cancer screening is generally recommended to begin approximately 10 years younger than the earliest age of breast cancer diagnosis in the family, or at age 40, whichever comes first. In this family, screening may begin at age 38. Breast screening options should be discussed with an individual's primary care provider and a genetic counselor, to determine at what age to begin screening, what screening is  appropriate, and if additional screening (such as breast MRI) is necessary based on personal/family history factors.    Other population cancer screening options, such as those recommended by the American Cancer Society and NCCN, are also appropriate for Simran and her family. These screening recommendations may change if there are changes to Simran's personal and/or family history of cancer. These screening recommendations may also change depending upon Simran's genetic testing results, if pursued. Final screening recommendations should be made by each individual's managing physician.    Plan:  1) Today Simran elected to speak to her sister with breast cancer first about genetic testing before proceeding with genetic testing herself.  2) Simran was provided my contact information and was encouraged to contact me with any questions, updates to the family history, and/or if she wishes to readdress her genetic testing options.  3) Cancer screening recommendations were reviewed for Simran and her close relatives based on the current family history. She was encouraged to contact me if she wishes to meet with DIMAS Lees to discuss increased breast screening.    Khloe Lane MS, [facility]  Licensed Genetic Counselor  Office: [phone]  Pager: [phone]

## 2020-11-20 ENCOUNTER — APPOINTMENT (OUTPATIENT)
Dept: INTERPRETER SERVICES | Facility: CLINIC | Age: 35
End: 2020-11-20
Payer: COMMERCIAL

## 2020-11-20 ENCOUNTER — PREP FOR PROCEDURE (OUTPATIENT)
Dept: OBGYN | Facility: CLINIC | Age: 35
End: 2020-11-20

## 2020-11-20 ENCOUNTER — OFFICE VISIT (OUTPATIENT)
Dept: OBGYN | Facility: CLINIC | Age: 35
End: 2020-11-20
Payer: COMMERCIAL

## 2020-11-20 ENCOUNTER — TELEPHONE (OUTPATIENT)
Dept: OBGYN | Facility: CLINIC | Age: 35
End: 2020-11-20

## 2020-11-20 VITALS
DIASTOLIC BLOOD PRESSURE: 76 MMHG | SYSTOLIC BLOOD PRESSURE: 118 MMHG | HEIGHT: 60 IN | WEIGHT: 202 LBS | BODY MASS INDEX: 39.66 KG/M2

## 2020-11-20 DIAGNOSIS — E66.09 CLASS 2 OBESITY DUE TO EXCESS CALORIES WITHOUT SERIOUS COMORBIDITY WITH BODY MASS INDEX (BMI) OF 39.0 TO 39.9 IN ADULT: ICD-10-CM

## 2020-11-20 DIAGNOSIS — R93.89 THICKENED ENDOMETRIUM: ICD-10-CM

## 2020-11-20 DIAGNOSIS — N93.9 ABNORMAL UTERINE BLEEDING: Primary | ICD-10-CM

## 2020-11-20 DIAGNOSIS — E66.812 CLASS 2 OBESITY DUE TO EXCESS CALORIES WITHOUT SERIOUS COMORBIDITY WITH BODY MASS INDEX (BMI) OF 39.0 TO 39.9 IN ADULT: ICD-10-CM

## 2020-11-20 DIAGNOSIS — N93.9 ABNORMAL UTERINE BLEEDING: ICD-10-CM

## 2020-11-20 DIAGNOSIS — R93.89 ENDOMETRIAL THICKENING ON ULTRASOUND: Primary | ICD-10-CM

## 2020-11-20 PROCEDURE — 87491 CHLMYD TRACH DNA AMP PROBE: CPT | Performed by: OBSTETRICS & GYNECOLOGY

## 2020-11-20 PROCEDURE — 99214 OFFICE O/P EST MOD 30 MIN: CPT | Performed by: OBSTETRICS & GYNECOLOGY

## 2020-11-20 PROCEDURE — 87591 N.GONORRHOEAE DNA AMP PROB: CPT | Performed by: OBSTETRICS & GYNECOLOGY

## 2020-11-20 RX ORDER — MEDROXYPROGESTERONE ACETATE 10 MG
10 TABLET ORAL DAILY
Qty: 10 TABLET | Refills: 0 | Status: SHIPPED | OUTPATIENT
Start: 2020-11-20 | End: 2020-11-30

## 2020-11-20 ASSESSMENT — ENCOUNTER SYMPTOMS
CHILLS: 0
NAUSEA: 0
FREQUENCY: 0
FEVER: 0
DIARRHEA: 0
VOMITING: 0
CONSTIPATION: 0
DYSURIA: 0

## 2020-11-20 ASSESSMENT — MIFFLIN-ST. JEOR: SCORE: 1527.77

## 2020-11-20 NOTE — TELEPHONE ENCOUNTER
Type of surgery: operative hysteroscopy with intrauterine morcellator, dilation and curettage  Location of surgery: [facility]  Date and time of surgery: 12/8/20 @ 7:30 am  Surgeon: Dr. Sy  Pre-Op Appt Date: 12/4/20  Post-Op Appt Date: n/a  Packet sent out: Yes  Pre-cert/Authorization completed:  No  Date: 11/20/20

## 2020-11-20 NOTE — NURSING NOTE
Chief Complaint   Patient presents with     Follow Up     Ultrasound and irregular menses        Initial /76 (BP Location: Right arm, Patient Position: Sitting, Cuff Size: Adult Large)   Ht 1.524 m (5')   Wt 91.6 kg (202 lb)   LMP 10/24/2020 (Within Days)   BMI 39.45 kg/m   Estimated body mass index is 39.45 kg/m  as calculated from the following:    Height as of this encounter: 1.524 m (5').    Weight as of this encounter: 91.6 kg (202 lb).  BP completed using cuff size: large    Questioned patient about current smoking habits.  Pt. has never smoked.          Isaiah Ruvalcaba, CMA

## 2020-11-20 NOTE — TELEPHONE ENCOUNTER
Procedure name(s) - multi select Operative hysteroscopy with intrauterine morcellator, Dilatation & curettage   Reason for procedure Abnormal uterine bleeding, thickened endometrium   Surgeon: Yossi   Is this a multi surgeon case? No   Laterality N/A   Request for additional equipment Other (see comments)   Comment: Myosure   Anesthesia General   Initiate Pre-op orders for above procedure: Yes, as ordered in Epic   Comment: Additional orders noted there also   Location of Case: [facility]   PA Assistant: No   Operating room  requested: No   Urgency of Surgery: Routine   Surgeon Procedure Time (incision to closure) in minutes (per procedure as applicable) 60   Note:  Surgical Case Time Needed (in minutes)   Date of Surgery (Requested): 12/8/2020   Patient Class (for admit prior to surgery, specify number of days in comments): Same day (hospital outpatient)   Why can t this outpatient surgery be done at the [facility] or  ASC? Not applicable   H&P To Be Completed By: PCP   Post-Op Appointment None   Vendor Needed? No

## 2020-11-20 NOTE — PROGRESS NOTES
SUBJECTIVE:                                                   Simran Barron is a 34 year old female who presents to clinic today with the Chief Complaint of:  No chief complaint on file.      I was asked to see this Pt in consultation by YARELY Mcintyre CNM for endometrial thickening and abnormal uterine bleeding.    I communicated with Pt via  because Pt speaks no/limited English.      Vaginal Bleeding (Metrorrhagia)  Onset: Since her baby was born in .  Description:  Pt has had long Hx irregular menses, but did get pregnant and delivered via  .  However after that she has continued to have irregular menses.  Duration of bleeding episodes: She currently has spotting daily for the last 2 months, prior to that when she would have full flows, she would bleed x 7 days  Frequency between periods:  Irregular intervals  Describe bleeding/flow: Currently spotting, but the prior full flows were heavy with flooding  Clots: YES- during the heavy flows.  Number of hours between maxipads/supertampons: 2 when heavy flows  Cramping: moderate    Accompanying Signs & Symptoms:  Weakness: no  Lightheadedness: YES  Hot flashes: no  Nosebleeds/Easy bruising: no  Vaginal Discharge: no    History:  Patient's last menstrual period was 10/24/2020.  Previous normal periods: no  Contraceptive use: NO  Possibility of Pregnancy: no - HCG Neg 11/3/2020  Any bleeding after intercourse: no  Abnormal PAP Smears: no - Pap WNL, HPV Neg    Precipitating factors:   None    Alleviating factors:  None    Therapies Tried and outcome: None tried      Patient's last menstrual period was 10/24/2020..   Patient is sexually active, .  Using none for contraception.    reports that she has never smoked. She has never used smokeless tobacco.    Problem list and histories reviewed & adjusted, as indicated.  Additional history: as documented.    Patient Active Problem List   Diagnosis     Abnormal thyroid  stimulating hormone level     FH: breast cancer in first degree relative     Past Surgical History:   Procedure Laterality Date     SURGICAL PATHOLOGY EXAM        Social History     Tobacco Use     Smoking status: Never Smoker     Smokeless tobacco: Never Used   Substance Use Topics     Alcohol use: No      Problem (# of Occurrences) Relation (Name,Age of Onset)    Breast Cancer (1) Sister (47)    Cancer (1) Paternal Grandfather    Diabetes (2) Other (Mother), Mother    Liver Cancer (1) Father            Current Outpatient Medications   Medication Sig     ibuprofen (ADVIL/MOTRIN) 200 MG tablet Take 2 tablets (400 mg) by mouth every 6 hours as needed for pain or moderate pain (Patient not taking: Reported on 10/30/2020)     Prenatal Vit-Fe Fumarate-FA (PRENATAL MULTIVITAMIN W/IRON) 27-0.8 MG tablet Take 1 tablet by mouth daily (Patient not taking: Reported on 10/30/2020)     vitamin D2 (ERGOCALCIFEROL) 1.25 MG (51943 UT) capsule Take 1 capsule (50,000 Units) by mouth once a week     No current facility-administered medications for this visit.      No Known Allergies    ROS:  Review of Systems   Constitutional: Negative for chills and fever.   Gastrointestinal: Negative for constipation, diarrhea, nausea and vomiting.   Genitourinary: Positive for menstrual problem and vaginal bleeding. Negative for dysuria, frequency, pelvic pain and vaginal discharge.   All other systems reviewed and are negative.        OBJECTIVE:     LMP 10/24/2020   There is no height or weight on file to calculate BMI.    Exam:  Physical Exam  Constitutional:       General: She is not in acute distress.     Appearance: She is obese. She is not ill-appearing.   HENT:      Head: Normocephalic.   Pulmonary:      Effort: Pulmonary effort is normal.   Skin:     General: Skin is warm.   Neurological:      Mental Status: She is alert.   Psychiatric:         Mood and Affect: Mood normal.     Abdomen- Abdomen soft, non-tender. BS normal. No masses,  organomegaly, positive findings: obese  Pelvic- Exam chaperoned by nurse, External genitalia normal, [place]th[place]'s glands normal, [place]'s glands normal, Urethral meatus normal, Urethra normal, Bladder normal, Vagina with normal rugae, no abnormal lesions, no abnormal discharge, Normal cervix without lesions or mucopus, no cervical motion tenderness, Uterus normal size, shape, and contour, no masses, non-tender, Adnexa normal size without masses or tenderness bilaterally, Anus normal, GC/Chlam probe was Done        Prior Pertinent Lab Results Reviewed:  Office Visit on 11/03/2020   Component Date Value Ref Range Status     FSH 11/03/2020 8.6  IU/L Final    Comment: FSH Reference Range  Female: Follicular      2.5-10.2          Mid-cycle       3.4-33.4          Luteal          1.5-9.1          Postmenopausal  23.0-116.3       Estradiol 11/03/2020 29  pg/mL Final    Comment: Estradiol reference ranges for pre-menopausal  Follicular     pg/mL  Mid-cycle    pg/mL  Luteal          pg/mL       Prolactin 11/03/2020 5  3 - 27 ug/L Final    Reference ranges apply to non-pregnant females only.     HCG Quantitative Serum 11/03/2020 <1  0 - 5 IU/L Final         Prior Pertinent Radiology Images Visualized by Me Today:  Results for orders placed or performed in visit on 11/06/20   US Pelvic Complete with Transvaginal    Narrative    [facility]  ULTRASOUND - PELVIC GYN- Transabdominal and Transvaginal     Referring MD: SAM OchoaM     ===================================     CLINICAL INFORMATION     Indications for ultrasound:   Bleeding/Menses - Metrorrhagia (irregular menses)     LMP: 10/24/2020    Hormones: none     Measurements:  Uterus:  8.35 x 5.37 x 4.89 cm     Position is anteverted.  Contour is smooth/regular.     Endo cav: 12.43 mm       Hyperechoic area within endometrium (1.37 x 0.69   x 1.21cm)     Right ovary: 2.88 x 2.5 x 2.1cm  Mult cysts on periphery  Left ovary:   3.34  x 1.93 x 2.26 cm Mult cysts on periphery     Cul de sac: no free fluid     ===================================  Complete pelvic ultrasound using realtime   transabdominal and transvaginal scanning  Bladder appears normal     Bilateral ovaries with multiple peripheral follicular cysts, suggestive of   PCOS. Recommend further clinical correlation as appropriate.     Endometrium with intracavitary hyperechoic area: polyp versus submucous   myoma  Consider hysterosonogram vs hysteroscopy for diagnosis       Dr. Danae Braxton, DO    Obstetrics and Gynecology  [facility]            ASSESSMENT:                                                      Encounter Diagnoses   Name Primary?     Endometrial thickening on ultrasound Yes     Abnormal uterine bleeding          PLAN:                                                      1)  GC/Chlam sent    2)  Rx Provera 10 mg x 10 days to induce w/d bleed.    3)  Schedule Oper hysteroscopy w/ I[facility], D&C after w/d bleed should have ended.  She understands the indications/risks/benefits/alternatives of the proposed procedure and has given informed consent.    4)  Preop w/ PCP.      Gopi Sy MD  [facility] [place]

## 2020-11-21 LAB
C TRACH DNA SPEC QL NAA+PROBE: NEGATIVE
N GONORRHOEA DNA SPEC QL NAA+PROBE: NEGATIVE
SPECIMEN SOURCE: NORMAL
SPECIMEN SOURCE: NORMAL

## 2020-11-23 ENCOUNTER — APPOINTMENT (OUTPATIENT)
Dept: INTERPRETER SERVICES | Facility: CLINIC | Age: 35
End: 2020-11-23
Payer: COMMERCIAL

## 2020-11-23 DIAGNOSIS — Z11.59 ENCOUNTER FOR SCREENING FOR OTHER VIRAL DISEASES: Primary | ICD-10-CM

## 2020-11-25 NOTE — PATIENT INSTRUCTIONS
Assessing Cancer Risk  Only about 5-10% of cancers are thought to be due to an inherited cancer susceptibility gene.    These families often have:    Several people with the same or related types of cancer    Cancers diagnosed at a young age (before age 50)    Individuals with more than one primary cancer    Multiple generations of the family affected with cancer    Some people may be candidates for genetic testing of more than one gene.  For these families, genetic testing using a cancer panel may be offered.  These panels will test different genes known to increase the risk for breast, ovarian, uterine, and/or other cancers. All of the genes discussed below have published clinical management guidelines for individuals who are found to carry a mutation. The purpose of this handout is to serve as a brief summary of the genes analyzed by the panels used to inquire about hereditary breast and gynecologic cancer:  BRET, BRCA1, BRCA2, BRIP1, CDH1, CHEK2, MLH1, MSH2, MSH6, PMS2, EPCAM, PTEN, PALB2, RAD51C, RAD51D, and TP53.  ______________________________________________________________________________  Hereditary Breast and Ovarian Cancer Syndrome   (BRCA1 and BRCA2)  A single mutation in one of the copies of BRCA1 or BRCA2 increases the risk for breast and ovarian cancer, among others.  The risk for pancreatic cancer and melanoma may also be slightly increased in some families.  The chart below shows the chance that someone with a BRCA mutation would develop cancer in his or her lifetime1,2,3,4.        A person s ethnic background is also important to consider, as individuals of Ashkenazi Jainism ancestry have a higher chance of having a BRCA gene mutation.  There are three BRCA mutations that occur more frequently in this population.    Lacey Syndrome   (MLH1, MSH2, MSH6, PMS2, and EPCAM)  Currently five genes are known to cause Lacey Syndrome: MLH1, MSH2, MSH6, PMS2, and EPCAM.  A single mutation in one of the  Lacey Syndrome genes increases the risk for colon, endometrial, ovarian, and stomach cancers.  Other cancers that occur less commonly in Lacey Syndrome include urinary tract, skin, and brain cancers.  The chart below shows the chance that a person with Lacey syndrome would develop cancer in his or her lifetime5.      *Cancer risk varies depending on Lacey syndrome gene found    Cowden Syndrome   (PTEN)  Cowden syndrome is a hereditary condition that increases the risk for breast, thyroid, endometrial, colon, and kidney cancer.  Cowden syndrome is caused by a mutation in the PTEN gene.  A single mutation in one of the copies of PTEN causes Cowden syndrome and increases cancer risk.  The chart below shows the chance that someone with a PTEN mutation would develop cancer in their lifetime6,7.  Other benign features seen in some individuals with Cowden syndrome include benign skin lesions (facial papules, keratoses, lipomas), learning disability, autism, thyroid nodules, colon polyps, and larger head size.      *One recent study found breast cancer risk to be increased to 85%    Li-Fraumeni Syndrome   (TP53)  Li-Fraumeni Syndrome (LFS) is a cancer predisposition syndrome caused by a mutation in the TP53 gene. A single mutation in one of the copies of TP53 increases the risk for multiple cancers. Individuals with LFS are at an increased risk for developing cancer at a young age. The lifetime risk for development of a LFS-associated cancer is 50% by age 30 and 90% by age 60.   Core Cancers: Sarcomas, Breast, Brain, Lung, Leukemias/Lymphomas, Adrenocortical carcinomas  Other Cancers: Gastrointestinal, Thyroid, Skin, Genitourinary    Hereditary Diffuse Gastric Cancer   (CDH1)  Currently, one gene is known to cause hereditary diffuse gastric cancer (HDGC): CDH1.  Individuals with HDGC are at increased risk for diffuse gastric cancer and lobular breast cancer. Of people diagnosed with HDGC, 30-50% have a mutation in the CDH1  gene.  This suggests there are likely other genes that may cause HDGC that have not been identified yet.      Lifetime Cancer Risks    General Population HDGC    Diffuse Gastric  <1% ~80%   Breast 12% 39-52%         Additional Genes  BERT  BERT is a moderate-risk breast cancer gene. Women who have a mutation in BERT can have between a 2-4 fold increased risk for breast cancer compared to the general population8. BERT mutations have also been associated with increased risk for pancreatic cancer, however an estimate of this cancer risk is not well understood9. Individuals who inherit two BERT mutations have a condition called ataxia-telangiectasia (AT).  This rare autosomal recessive condition affects the nervous system and immune system, and is associated with progressive cerebellar ataxia beginning in childhood.  Individuals with ataxia-telangiectasia often have a weakened immune system and have an increased risk for childhood cancers.    PALB2  Mutations in PALB2 have been shown to increase the risk of breast cancer up to 33-58% in some families; where individuals fall within this risk range is dependent upon family [id number]. PALB2 mutations have also been associated with increased risk for pancreatic cancer, although this risk has not been quantified yet.  Individuals who inherit two PALB2 mutations--one from their mother and one from their father--have a condition called Fanconi Anemia.  This rare autosomal recessive condition is associated with short stature, developmental delay, bone marrow failure, and increased risk for childhood cancers.    CHEK2   CHEK2 is a moderate-risk breast cancer gene.  Women who have a mutation in CHEK2 have around a 2-fold increased risk for breast cancer compared to the general population, and this risk may be higher depending upon family history.11,12,13 Mutations in CHEK2 have also been shown to increase the risk of a number of other cancers, including colon and prostate, however  these cancer risks are currently not well understood.    BRIP1, RAD51C and RAD51D  Mutations in BRIP1, RAD51C, and RAD51D have been shown to increase the risk of ovarian cancer and possibly female breast cancer as well14,15 .       Lifetime Cancer Risk    General Population BRIP1 RAD51C RAD51D   Ovarian 1-2% ~5-8% ~5-9% ~7-15%           Inheritance  All of the cancer syndromes reviewed above are inherited in an autosomal dominant pattern.  This means that if a parent has a mutation, each of his or her children will have a 50% chance of inheriting that same mutation.  Therefore, each child--male or female--would have a 50% chance of being at increased risk for developing cancer.      Image obtained from Genetics Home Reference, 2013     Mutations in some genes can occur de melvin, which means that a person s mutation occurred for the first time in them and was not inherited from a parent.  Now that they have the mutation, however, it can be passed on to future generations.    Genetic Testing  Genetic testing involves a blood test and will look at the genetic information in the BERT, BRCA1, BRCA2, BRIP1, CDH1, CHEK2, MLH1, MSH2, MSH6, PMS2, EPCAM, PTEN, PALB2, RAD51C, RAD51D, and TP53 genes for any harmful mutations that are associated with increased cancer risk.  If possible, it is recommended that the person(s) who has had cancer be tested before other family members.  That person will give us the most useful information about whether or not a specific gene is associated with the cancer in the family.    Results  There are three possible results of genetic testing:    Positive--a harmful mutation was identified in one or more of the genes    Negative--no mutation was identified in any of the genes on this panel    Variant of unknown significance--a variation in one of the genes was identified, but it is unclear how this impacts cancer risk in the family    Advantages and Disadvantages   There are advantages and  disadvantages to genetic testing.    Advantages    May clarify your cancer risk    Can help you make medical decisions    May explain the cancers in your family    May give useful information to your family members (if you share your results)    Disadvantages    Possible negative emotional impact of learning about inherited cancer risk    Uncertainty in interpreting a negative test result in some situations    Possible genetic discrimination concerns (see below)    Genetic Information Nondiscrimination Act (MURIEL)  MURIEL is a federal law that protects individuals from health insurance or employment discrimination based on a genetic test result alone.  Although rare, there are currently no legal discrimination protections in terms of life insurance, long term care, or disability insurances.  Visit the [employer] website to learn more.    Reducing Cancer Risk  All of the genes described above have nationally recognized cancer screening guidelines that would be recommended for individuals who test positive.  In addition to increased cancer screening, surgeries may be offered or recommended to reduce cancer risk.  Recommendations are based upon an individual s genetic test result as well as their personal and family history of cancer.    Questions to Think About Regarding Genetic Testing:    What effect will the test result have on me and my relationship with my family members if I have an inherited gene mutation?  If I don t have a gene mutation?    Should I share my test results, and how will my family react to this news, which may also affect them?    Are my children ready to learn new information that may one day affect their own health?    Hereditary Cancer Resources    FORCE: Facing Our Risk of Cancer Empowered facingourrisk.org   Bright Pink bebrightpink.org   Li-Fraumeni Syndrome Association lfsassociation.org   PTEN World PTENworld.com   No stomach for cancer, Inc.  nostomachforcancer.org   Stomach cancer relief network Scrnet.org   Collaborative Group of the Americas on Inherited Colorectal Cancer (CGA) cgaicc.com    Cancer Care cancercare.org   American Cancer Society (ACS) cancer.org   National Cancer Ponder (NCI) cancer.gov     Please call us if you have any questions or concerns.   Cancer Risk Management Program [phone]-CANCER ([phone])  ? Vic Mancia, MS, [facility] [phone]  ? Ana Link, MS, [facility]  [phone]  ? Senait Oakley, MS, [facility]  [phone]  ? Khloe Lane, MS, [facility] [phone]  ? Devika Barbara, MS, [facility] [phone]  ? Stephon Sanchez, MS, [facility]  [phone]  ? Kristal Adame, MS, [facility]  [phone]    References  1. Dorothy CUEVA, Kashmir PDP, Toni S, Moises CORDOVA, Dameon JE, Monet JL, Viridiana N, Maya H, Teodoro O, Chantel A, Olafini B, Radibrisa P, Mankaseykiche S, Mg DM, Root N, Antwon E, Arian H, Freddy E, Jose G J, Gronpiotr J, Vanda B, Phillipus H, Thorlacius S, Eerola H, Nevchelinna H, Yang K, Randall OP. Average risks of breast and ovarian cancer associated with BRCA1 or BRCA2 mutations detected in case series unselected for family history: a combined analysis of 222 studies. Am J Hum Jessica. 2003;72:1117-30.  2. Rod N, Shanita M, Hernandez G.  BRCA1 and BRCA2 Hereditary Breast and Ovarian Cancer. Gene Reviews online. 2013.  3. Darin YC, Lexi S, Rianna G, Stephens S. Breast cancer risk among male BRCA1 and BRCA2 mutation carriers. J Natl Cancer Inst. 2007;99:1811-4.  4. Renato PRAISI, Camacho I, Guicho J, Alba E, Dwight ER, Silviano F. Risk of breast cancer in male BRCA2 carriers. J Med Jessica. 2010;47:710-1.  5. National Comprehensive Cancer Network. Clinical practice guidelines in oncology, colorectal cancer screening. Available online (registration required). 2015.  6. Glenn RAMIREZ, Karthikeyan J, Santi J, Jennifer LA, Hayder PUENTE, Bea C. Lifetime cancer risks in individuals with germline PTEN mutations. Clin Cancer Res. 2012;18:400-7.  7. Pilarski R. Cowden  Syndrome: A Critical Review of the Clinical Literature. J Jessica . 2009:18:13-27.  8. Pamela A, Eleazar D, Cathy S, Onelia P, Elvis T, Aisha M, Paulie B, Peyton H, Tommy R, Shandra K, Surya L, Renato DG, Mg D, Didier DF, Tal MR, The Breast Cancer Susceptibility Collaboration (UK) & Fabi MOREIRA. BERT mutations that cause ataxia-telangiectasia are breast cancer susceptibility alleles. Nature Genetics. 2006;38:873-875  9. Kenneth N , Gladys Y, Thuy J, Rosey L, Mayank GM , Heber ML, Gallinger S, Newsome AG, Syngal S, Ravin ML, Ronaldo J , Tonny R, Myke SZ, Jonna JR, Oscar VE, Mariya M, Vosameera B, Jones N, Ondina RH, Jennifer KW, and Louise AP. BERT mutations in patients with hereditary pancreatic cancer. Cancer Discover. 2012;2:41-46  10. Dorothy LOCKE, et al. Breast-Cancer Risk in Families with Mutations in PALB2. NEJM. 2014; 371(6):497-506.  11. CHEK2 Breast Cancer Case-Control Consortium. CHEK2*1100delC and susceptibility to breast cancer: A collaborative analysis involving 10,860 breast cancer cases and 9,065 controls from 10 studies. Am J Hum Jessica, 74 (2004), pp. 7909-1978  12. Raul T, Hesham S, Nikki K, et al. Spectrum of Mutations in BRCA1, BRCA2, CHEK2, and TP53 in Families at High Risk of Breast Cancer. GLORIA. 2006;295(12):2769-4284.   13. Yu C, Yaneth D, Yanna A, et al. Risk of breast cancer in women with a CHEK2 mutation with and without a family history of breast cancer. J Clin Oncol. 2011;29:9847-4234.  14. Isaiah H, Dale E, Damion SJ, et al. Contribution of germline mutations in the RAD51B, RAD51C, and RAD51D genes to ovarian cancer in the population. J Clin Oncol. 2015;33(26):2549-8915. Doi:10.1200/JCO.2015.61.2408.  15. Constanza T, Andrade CASTRO, Omid P, et al. Mutations in BRIP1 confer high risk of ovarian cancer. Sulema Jessica. 2011;43(11):2953-9217. doi:10.1038/ng.955.

## 2020-11-30 ENCOUNTER — TELEPHONE (OUTPATIENT)
Dept: ONCOLOGY | Facility: CLINIC | Age: 35
End: 2020-11-30

## 2020-11-30 ENCOUNTER — APPOINTMENT (OUTPATIENT)
Dept: INTERPRETER SERVICES | Facility: CLINIC | Age: 35
End: 2020-11-30
Payer: COMMERCIAL

## 2020-11-30 NOTE — TELEPHONE ENCOUNTER
11/30/2020    I returned Simran's phone call today regarding updates to her family history. As I was unable to reach her, I left a non-detailed voicemail with my name and phone number with the aid of a .    Khloe Lane MS, Located within [facility]  Licensed Genetic Counselor  Office: [phone]  Pager: [phone]

## 2020-12-01 ENCOUNTER — APPOINTMENT (OUTPATIENT)
Dept: INTERPRETER SERVICES | Facility: CLINIC | Age: 35
End: 2020-12-01
Payer: COMMERCIAL

## 2020-12-02 ENCOUNTER — APPOINTMENT (OUTPATIENT)
Dept: INTERPRETER SERVICES | Facility: CLINIC | Age: 35
End: 2020-12-02
Payer: COMMERCIAL

## 2020-12-04 ENCOUNTER — OFFICE VISIT (OUTPATIENT)
Dept: INTERNAL MEDICINE | Facility: CLINIC | Age: 35
End: 2020-12-04
Payer: COMMERCIAL

## 2020-12-04 VITALS
RESPIRATION RATE: 16 BRPM | BODY MASS INDEX: 38.47 KG/M2 | OXYGEN SATURATION: 98 % | SYSTOLIC BLOOD PRESSURE: 100 MMHG | DIASTOLIC BLOOD PRESSURE: 78 MMHG | WEIGHT: 197 LBS | TEMPERATURE: 98.6 F | HEART RATE: 67 BPM

## 2020-12-04 DIAGNOSIS — Z11.59 ENCOUNTER FOR SCREENING FOR OTHER VIRAL DISEASES: ICD-10-CM

## 2020-12-04 DIAGNOSIS — Z01.818 PREOP GENERAL PHYSICAL EXAM: Primary | ICD-10-CM

## 2020-12-04 DIAGNOSIS — R93.89 THICKENED ENDOMETRIUM: ICD-10-CM

## 2020-12-04 DIAGNOSIS — N93.9 ABNORMAL UTERINE BLEEDING: ICD-10-CM

## 2020-12-04 PROCEDURE — U0003 INFECTIOUS AGENT DETECTION BY NUCLEIC ACID (DNA OR RNA); SEVERE ACUTE RESPIRATORY SYNDROME CORONAVIRUS 2 (SARS-COV-2) (CORONAVIRUS DISEASE [COVID-19]), AMPLIFIED PROBE TECHNIQUE, MAKING USE OF HIGH THROUGHPUT TECHNOLOGIES AS DESCRIBED BY CMS-2020-01-R: HCPCS | Performed by: OBSTETRICS & GYNECOLOGY

## 2020-12-04 PROCEDURE — 99214 OFFICE O/P EST MOD 30 MIN: CPT | Performed by: PHYSICIAN ASSISTANT

## 2020-12-04 NOTE — PATIENT INSTRUCTIONS

## 2020-12-04 NOTE — PROGRESS NOTES
[address] [address]  Phone: [phone]  Primary Provider: Giovanna Florian  Pre-op Performing Provider:    HUYEN ZAMUDIO,     PREOPERATIVE EVALUATION:  Today's date: 12/4/2020    Simran Barron is a 34 year old female who presents for a preoperative evaluation.    Surgical Information:  Surgery/Procedure: OPERATIVE HYSTEROSCOPY WITH INTRAUTERINE MORCELLATOR, DILATION AND CURETTAGE  Surgery Location: [facility]   Surgeon: Dr. Sy  Surgery Date: 12/8/20  Time of Surgery: 7:30 AM  Where patient plans to recover: At home with family  Fax number for surgical facility: Note does not need to be faxed, will be available electronically in Epic.    Type of Anesthesia Anticipated: General    Subjective     HPI related to upcoming procedure:       Preop Questions 12/4/2020   1. Have you ever had a heart attack or stroke? No   2. Have you ever had surgery on your heart or blood vessels, such as a stent placement, a coronary artery bypass, or surgery on an artery in your head, neck, heart, or legs? No   3. Do you have chest pain with activity? No   4. Do you have a history of  heart failure? No   5. Do you currently have a cold, bronchitis or symptoms of other infection? No   6. Do you have a cough, shortness of breath, or wheezing? No   7. Do you or anyone in your family have previous history of blood clots? No   8. Do you or does anyone in your family have a serious bleeding problem such as prolonged bleeding following surgeries or cuts? No   9. Have you ever had problems with anemia or been told to take iron pills? No   10. Have you had any abnormal blood loss such as black, tarry or bloody stools, or abnormal vaginal bleeding? Abnormal vaginal bleeding    11. Have you ever had a blood transfusion? No   12. Are you willing to have a blood transfusion if it is medically needed before, during, or after your  surgery? Yes   13. Have you or any of your relatives ever had problems with anesthesia? No   14. Do you have sleep apnea, excessive snoring or daytime drowsiness? Yes, nausea and vomiting    15. Do you have any artifical heart valves or other implanted medical devices like a pacemaker, defibrillator, or continuous glucose monitor? No   16. Do you have artificial joints? No   17. Are you allergic to latex? No   18. Is there any chance that you may be pregnant? No     Health Care Directive:  Patient does not have a Health Care Directive or Living Will:     Preoperative Review of :   reviewed - no record of controlled substances prescribed.      Status of Chronic Conditions:  See problem list for active medical problems.  Problems all longstanding and stable, except as noted/documented.  See ROS for pertinent symptoms related to these conditions.      Review of Systems  CONSTITUTIONAL: NEGATIVE for fever, chills, change in weight  ENT/MOUTH: NEGATIVE for ear, mouth and throat problems  RESP: NEGATIVE for significant cough or SOB  CV: NEGATIVE for chest pain, palpitations or peripheral edema  GI: NEGATIVE for nausea, abdominal pain, heartburn, or change in bowel habits  MUSCULOSKELETAL: NEGATIVE for significant arthralgias or myalgia  ENDOCRINE: NEGATIVE for temperature intolerance, skin/hair changes  HEME/ALLERGY/IMMUNE: NEGATIVE for bleeding problems  PSYCHIATRIC: NEGATIVE for changes in mood or affect  ROS otherwise negative    Patient Active Problem List    Diagnosis Date Noted     Class 2 obesity due to excess calories without serious comorbidity in adult 11/20/2020     Priority: Medium     Abnormal uterine bleeding 11/20/2020     Priority: Medium     Added automatically from request for surgery [id number]       Thickened endometrium 11/20/2020     Priority: Medium     Added automatically from request for surgery [id number]       FH: breast cancer in first degree relative 03/10/2016     Priority: Medium     Sister  with breast cancer and ovarian cancer       Abnormal thyroid stimulating hormone level 01/19/2016     Priority: Medium     Patient referred to Dr. Samaniego for ongoing evaluation for possible hyperthyroidism.  ; TSH level is low        Past Medical History:   Diagnosis Date     Abdominal pain      Abnormal menses      Complication of anesthesia      PONV (postoperative nausea and vomiting)      Past Surgical History:   Procedure Laterality Date     HYSTEROSCOPY,REMOVE MYOMA  2011    [place] [facility] [place]     Current Outpatient Medications   Medication Sig Dispense Refill     vitamin D2 (ERGOCALCIFEROL) 1.25 MG (89142 UT) capsule Take 1 capsule (50,000 Units) by mouth once a week 8 capsule 0       No Known Allergies     Social History     Tobacco Use     Smoking status: Never Smoker     Smokeless tobacco: Never Used   Substance Use Topics     Alcohol use: No       History   Drug Use No         Objective     /78 (BP Location: Left arm, Patient Position: Chair, Cuff Size: Adult Regular)   Pulse 67   Temp 98.6  F (37  C) (Temporal)   Resp 16   Wt 89.4 kg (197 lb)   LMP 12/02/2020   SpO2 98%   BMI 38.47 kg/m      Physical Exam  GENERAL APPEARANCE: healthy, alert and no distress  HENT: ear canals and TM's normal and nose and mouth without ulcers or lesions  RESP: lungs clear to auscultation - no rales, rhonchi or wheezes  CV: regular rate and rhythm, normal S1 S2, no S3 or S4 and no murmur, click or rub   ABDOMEN: soft, nontender, no HSM or masses and bowel sounds normal  MS: extremities normal- no gross deformities noted  SKIN: no suspicious lesions or rashes  NEURO: Normal strength and tone, sensory exam grossly normal, mentation intact and speech normal  PSYCH: mentation appears normal and affect normal/bright    Recent Labs   Lab Test 10/30/20  0753 07/05/19  0816   HGB 13.6 13.6    249   NA  --  141   POTASSIUM  --  4.1   CR  --  0.58        Diagnostics:  No labs were ordered during this visit.   No EKG required,  no history of coronary heart disease, significant arrhythmia, peripheral arterial disease or other structural heart disease.    Revised Cardiac Risk Index (RCRI):  The patient has the following serious cardiovascular risks for perioperative complications:   - No serious cardiac risks = 0 points     RCRI Interpretation: 0 points: Class I (very low risk - 0.4% complication rate)           Assessment & Plan   The proposed surgical procedure is considered INTERMEDIATE risk.    Preop general physical exam      Abnormal uterine bleeding      Thickened endometrium          Risks and Recommendations:  The patient has the following additional risks and recommendations for perioperative complications:   - No identified additional risk factors other than previously addressed    Medication Instructions:  Patient is on no chronic medications    RECOMMENDATION:  APPROVAL GIVEN to proceed with proposed procedure, without further diagnostic evaluation.    Signed Electronically by: Rosmery Gipson PA-C    Copy of this evaluation report is provided to requesting physician.    [facility]    Revised Cardiac Risk Index

## 2020-12-05 LAB
LABORATORY COMMENT REPORT: NORMAL
SARS-COV-2 RNA SPEC QL NAA+PROBE: NEGATIVE
SARS-COV-2 RNA SPEC QL NAA+PROBE: NORMAL
SPECIMEN SOURCE: NORMAL
SPECIMEN SOURCE: NORMAL

## 2020-12-08 ENCOUNTER — ANESTHESIA EVENT (OUTPATIENT)
Dept: SURGERY | Facility: CLINIC | Age: 35
End: 2020-12-08
Payer: COMMERCIAL

## 2020-12-08 ENCOUNTER — ANESTHESIA (OUTPATIENT)
Dept: SURGERY | Facility: CLINIC | Age: 35
End: 2020-12-08
Payer: COMMERCIAL

## 2020-12-08 ENCOUNTER — HOSPITAL ENCOUNTER (OUTPATIENT)
Facility: CLINIC | Age: 35
Discharge: HOME | End: 2020-12-08
Attending: OBSTETRICS & GYNECOLOGY | Admitting: OBSTETRICS & GYNECOLOGY
Payer: COMMERCIAL

## 2020-12-08 VITALS
TEMPERATURE: 97.3 F | DIASTOLIC BLOOD PRESSURE: 74 MMHG | HEIGHT: 60 IN | OXYGEN SATURATION: 97 % | BODY MASS INDEX: 39.85 KG/M2 | WEIGHT: 203 LBS | SYSTOLIC BLOOD PRESSURE: 110 MMHG | RESPIRATION RATE: 16 BRPM | HEART RATE: 78 BPM

## 2020-12-08 DIAGNOSIS — R93.89 THICKENED ENDOMETRIUM: ICD-10-CM

## 2020-12-08 DIAGNOSIS — N93.9 ABNORMAL UTERINE BLEEDING: ICD-10-CM

## 2020-12-08 DIAGNOSIS — G89.18 POSTOPERATIVE PAIN: ICD-10-CM

## 2020-12-08 DIAGNOSIS — N84.0 ENDOMETRIAL POLYP: Primary | ICD-10-CM

## 2020-12-08 LAB
HCG UR QL: NEGATIVE
HGB BLD-MCNC: 12.2 G/DL (ref 11.7–15.7)

## 2020-12-08 PROCEDURE — 370N000001 HC ANESTHESIA TECHNICAL FEE, 1ST 30 MIN: Performed by: OBSTETRICS & GYNECOLOGY

## 2020-12-08 PROCEDURE — 36415 COLL VENOUS BLD VENIPUNCTURE: CPT | Performed by: OBSTETRICS & GYNECOLOGY

## 2020-12-08 PROCEDURE — 761N000001 HC RECOVERY PHASE 1 LEVEL 1 FIRST HR: Performed by: OBSTETRICS & GYNECOLOGY

## 2020-12-08 PROCEDURE — 258N000003 HC RX IP 258 OP 636: Performed by: ANESTHESIOLOGY

## 2020-12-08 PROCEDURE — 250N000011 HC RX IP 250 OP 636: Performed by: OBSTETRICS & GYNECOLOGY

## 2020-12-08 PROCEDURE — 250N000011 HC RX IP 250 OP 636: Performed by: NURSE ANESTHETIST, CERTIFIED REGISTERED

## 2020-12-08 PROCEDURE — 250N000009 HC RX 250: Performed by: NURSE ANESTHETIST, CERTIFIED REGISTERED

## 2020-12-08 PROCEDURE — 761N000002 HC RECOVERY PHASE 1 LEVEL 1 EA ADDTL HR: Performed by: OBSTETRICS & GYNECOLOGY

## 2020-12-08 PROCEDURE — 88305 TISSUE EXAM BY PATHOLOGIST: CPT | Mod: 26

## 2020-12-08 PROCEDURE — 58558 HYSTEROSCOPY BIOPSY: CPT | Performed by: OBSTETRICS & GYNECOLOGY

## 2020-12-08 PROCEDURE — 85018 HEMOGLOBIN: CPT | Performed by: OBSTETRICS & GYNECOLOGY

## 2020-12-08 PROCEDURE — 250N000009 HC RX 250: Performed by: ANESTHESIOLOGY

## 2020-12-08 PROCEDURE — 761N000007 HC RECOVERY PHASE 2 EACH 15 MINS: Performed by: OBSTETRICS & GYNECOLOGY

## 2020-12-08 PROCEDURE — 88305 TISSUE EXAM BY PATHOLOGIST: CPT | Mod: TC | Performed by: OBSTETRICS & GYNECOLOGY

## 2020-12-08 PROCEDURE — 370N000002 HC ANESTHESIA TECHNICAL FEE, EACH ADDTL 15 MIN: Performed by: OBSTETRICS & GYNECOLOGY

## 2020-12-08 PROCEDURE — 81025 URINE PREGNANCY TEST: CPT | Performed by: OBSTETRICS & GYNECOLOGY

## 2020-12-08 PROCEDURE — 272N000001 HC OR GENERAL SUPPLY STERILE: Performed by: OBSTETRICS & GYNECOLOGY

## 2020-12-08 PROCEDURE — 360N000021 HC SURGERY LEVEL 3 EA 15 ADDTL MIN: Performed by: OBSTETRICS & GYNECOLOGY

## 2020-12-08 PROCEDURE — 360N000020 HC SURGERY LEVEL 3 1ST 30 MIN: Performed by: OBSTETRICS & GYNECOLOGY

## 2020-12-08 PROCEDURE — 999N000136 HC STATISTIC PRE PROC ASSESS II: Performed by: OBSTETRICS & GYNECOLOGY

## 2020-12-08 RX ORDER — ONDANSETRON 2 MG/ML
4 INJECTION INTRAMUSCULAR; INTRAVENOUS EVERY 30 MIN PRN
Status: DISCONTINUED | OUTPATIENT
Start: 2020-12-08 | End: 2020-12-08 | Stop reason: HOSPADM

## 2020-12-08 RX ORDER — SODIUM CHLORIDE, SODIUM LACTATE, POTASSIUM CHLORIDE, CALCIUM CHLORIDE 600; 310; 30; 20 MG/100ML; MG/100ML; MG/100ML; MG/100ML
INJECTION, SOLUTION INTRAVENOUS CONTINUOUS
Status: DISCONTINUED | OUTPATIENT
Start: 2020-12-08 | End: 2020-12-08 | Stop reason: HOSPADM

## 2020-12-08 RX ORDER — HYDROMORPHONE HYDROCHLORIDE 1 MG/ML
.3-.5 INJECTION, SOLUTION INTRAMUSCULAR; INTRAVENOUS; SUBCUTANEOUS EVERY 10 MIN PRN
Status: DISCONTINUED | OUTPATIENT
Start: 2020-12-08 | End: 2020-12-08 | Stop reason: HOSPADM

## 2020-12-08 RX ORDER — OXYCODONE HYDROCHLORIDE 5 MG/1
5 TABLET ORAL
Status: DISCONTINUED | OUTPATIENT
Start: 2020-12-08 | End: 2020-12-08 | Stop reason: HOSPADM

## 2020-12-08 RX ORDER — ALBUTEROL SULFATE 0.83 MG/ML
2.5 SOLUTION RESPIRATORY (INHALATION) EVERY 4 HOURS PRN
Status: DISCONTINUED | OUTPATIENT
Start: 2020-12-08 | End: 2020-12-08 | Stop reason: HOSPADM

## 2020-12-08 RX ORDER — PROPOFOL 10 MG/ML
INJECTION, EMULSION INTRAVENOUS CONTINUOUS PRN
Status: DISCONTINUED | OUTPATIENT
Start: 2020-12-08 | End: 2020-12-08

## 2020-12-08 RX ORDER — ACETAMINOPHEN 650 MG/1
650 SUPPOSITORY RECTAL ONCE
Status: DISCONTINUED | OUTPATIENT
Start: 2020-12-08 | End: 2020-12-08 | Stop reason: HOSPADM

## 2020-12-08 RX ORDER — KETOROLAC TROMETHAMINE 30 MG/ML
30 INJECTION, SOLUTION INTRAMUSCULAR; INTRAVENOUS ONCE
Status: COMPLETED | OUTPATIENT
Start: 2020-12-08 | End: 2020-12-08

## 2020-12-08 RX ORDER — CEFAZOLIN SODIUM 2 G/100ML
2 INJECTION, SOLUTION INTRAVENOUS
Status: COMPLETED | OUTPATIENT
Start: 2020-12-08 | End: 2020-12-08

## 2020-12-08 RX ORDER — IBUPROFEN 600 MG/1
600 TABLET, FILM COATED ORAL EVERY 6 HOURS PRN
Qty: 30 TABLET | Refills: 0 | Status: SHIPPED | OUTPATIENT
Start: 2020-12-08 | End: 2021-04-07

## 2020-12-08 RX ORDER — NALOXONE HYDROCHLORIDE 0.4 MG/ML
0.4 INJECTION, SOLUTION INTRAMUSCULAR; INTRAVENOUS; SUBCUTANEOUS
Status: DISCONTINUED | OUTPATIENT
Start: 2020-12-08 | End: 2020-12-08 | Stop reason: HOSPADM

## 2020-12-08 RX ORDER — ONDANSETRON 2 MG/ML
INJECTION INTRAMUSCULAR; INTRAVENOUS PRN
Status: DISCONTINUED | OUTPATIENT
Start: 2020-12-08 | End: 2020-12-08

## 2020-12-08 RX ORDER — LIDOCAINE 40 MG/G
CREAM TOPICAL
Status: DISCONTINUED | OUTPATIENT
Start: 2020-12-08 | End: 2020-12-08 | Stop reason: HOSPADM

## 2020-12-08 RX ORDER — FENTANYL CITRATE 50 UG/ML
INJECTION, SOLUTION INTRAMUSCULAR; INTRAVENOUS PRN
Status: DISCONTINUED | OUTPATIENT
Start: 2020-12-08 | End: 2020-12-08

## 2020-12-08 RX ORDER — HYDRALAZINE HYDROCHLORIDE 20 MG/ML
2.5-5 INJECTION INTRAMUSCULAR; INTRAVENOUS EVERY 10 MIN PRN
Status: DISCONTINUED | OUTPATIENT
Start: 2020-12-08 | End: 2020-12-08 | Stop reason: HOSPADM

## 2020-12-08 RX ORDER — CEFAZOLIN SODIUM 1 G/3ML
1 INJECTION, POWDER, FOR SOLUTION INTRAMUSCULAR; INTRAVENOUS SEE ADMIN INSTRUCTIONS
Status: DISCONTINUED | OUTPATIENT
Start: 2020-12-08 | End: 2020-12-08 | Stop reason: HOSPADM

## 2020-12-08 RX ORDER — NALOXONE HYDROCHLORIDE 0.4 MG/ML
0.2 INJECTION, SOLUTION INTRAMUSCULAR; INTRAVENOUS; SUBCUTANEOUS
Status: DISCONTINUED | OUTPATIENT
Start: 2020-12-08 | End: 2020-12-08 | Stop reason: HOSPADM

## 2020-12-08 RX ORDER — PROPOFOL 10 MG/ML
INJECTION, EMULSION INTRAVENOUS PRN
Status: DISCONTINUED | OUTPATIENT
Start: 2020-12-08 | End: 2020-12-08

## 2020-12-08 RX ORDER — OXYCODONE HYDROCHLORIDE 5 MG/1
5 TABLET ORAL EVERY 4 HOURS PRN
Status: DISCONTINUED | OUTPATIENT
Start: 2020-12-08 | End: 2020-12-08 | Stop reason: HOSPADM

## 2020-12-08 RX ORDER — ONDANSETRON 4 MG/1
4 TABLET, ORALLY DISINTEGRATING ORAL EVERY 30 MIN PRN
Status: DISCONTINUED | OUTPATIENT
Start: 2020-12-08 | End: 2020-12-08 | Stop reason: HOSPADM

## 2020-12-08 RX ORDER — LABETALOL 20 MG/4 ML (5 MG/ML) INTRAVENOUS SYRINGE
10
Status: DISCONTINUED | OUTPATIENT
Start: 2020-12-08 | End: 2020-12-08 | Stop reason: HOSPADM

## 2020-12-08 RX ORDER — DEXAMETHASONE SODIUM PHOSPHATE 4 MG/ML
INJECTION, SOLUTION INTRA-ARTICULAR; INTRALESIONAL; INTRAMUSCULAR; INTRAVENOUS; SOFT TISSUE PRN
Status: DISCONTINUED | OUTPATIENT
Start: 2020-12-08 | End: 2020-12-08

## 2020-12-08 RX ORDER — FENTANYL CITRATE 50 UG/ML
25-50 INJECTION, SOLUTION INTRAMUSCULAR; INTRAVENOUS
Status: DISCONTINUED | OUTPATIENT
Start: 2020-12-08 | End: 2020-12-08 | Stop reason: HOSPADM

## 2020-12-08 RX ORDER — MEPERIDINE HYDROCHLORIDE 25 MG/ML
12.5 INJECTION INTRAMUSCULAR; INTRAVENOUS; SUBCUTANEOUS
Status: DISCONTINUED | OUTPATIENT
Start: 2020-12-08 | End: 2020-12-08 | Stop reason: HOSPADM

## 2020-12-08 RX ORDER — KETOROLAC TROMETHAMINE 30 MG/ML
30 INJECTION, SOLUTION INTRAMUSCULAR; INTRAVENOUS EVERY 6 HOURS PRN
Status: DISCONTINUED | OUTPATIENT
Start: 2020-12-08 | End: 2020-12-08 | Stop reason: HOSPADM

## 2020-12-08 RX ORDER — SCOLOPAMINE TRANSDERMAL SYSTEM 1 MG/1
1 PATCH, EXTENDED RELEASE TRANSDERMAL
Status: DISCONTINUED | OUTPATIENT
Start: 2020-12-08 | End: 2020-12-08 | Stop reason: HOSPADM

## 2020-12-08 RX ORDER — DIAZEPAM 10 MG/2ML
2.5 INJECTION, SOLUTION INTRAMUSCULAR; INTRAVENOUS
Status: DISCONTINUED | OUTPATIENT
Start: 2020-12-08 | End: 2020-12-08 | Stop reason: HOSPADM

## 2020-12-08 RX ADMIN — ONDANSETRON HYDROCHLORIDE 4 MG: 2 INJECTION, SOLUTION INTRAVENOUS at 08:08

## 2020-12-08 RX ADMIN — LIDOCAINE HYDROCHLORIDE 50 MG: 10 INJECTION, SOLUTION EPIDURAL; INFILTRATION; INTRACAUDAL; PERINEURAL at 07:37

## 2020-12-08 RX ADMIN — DEXAMETHASONE SODIUM PHOSPHATE 4 MG: 4 INJECTION, SOLUTION INTRA-ARTICULAR; INTRALESIONAL; INTRAMUSCULAR; INTRAVENOUS; SOFT TISSUE at 07:37

## 2020-12-08 RX ADMIN — MIDAZOLAM 2 MG: 1 INJECTION INTRAMUSCULAR; INTRAVENOUS at 07:31

## 2020-12-08 RX ADMIN — SCOPALAMINE 1 PATCH: 1 PATCH, EXTENDED RELEASE TRANSDERMAL at 07:04

## 2020-12-08 RX ADMIN — FENTANYL CITRATE 100 MCG: 50 INJECTION, SOLUTION INTRAMUSCULAR; INTRAVENOUS at 07:37

## 2020-12-08 RX ADMIN — PROPOFOL 200 MG: 10 INJECTION, EMULSION INTRAVENOUS at 07:37

## 2020-12-08 RX ADMIN — CEFAZOLIN SODIUM 2 G: 2 INJECTION, SOLUTION INTRAVENOUS at 07:32

## 2020-12-08 RX ADMIN — PROPOFOL 50 MCG/KG/MIN: 10 INJECTION, EMULSION INTRAVENOUS at 07:43

## 2020-12-08 RX ADMIN — KETOROLAC TROMETHAMINE 30 MG: 30 INJECTION, SOLUTION INTRAMUSCULAR at 08:31

## 2020-12-08 RX ADMIN — SODIUM CHLORIDE, POTASSIUM CHLORIDE, SODIUM LACTATE AND CALCIUM CHLORIDE: 600; 310; 30; 20 INJECTION, SOLUTION INTRAVENOUS at 06:50

## 2020-12-08 ASSESSMENT — MIFFLIN-ST. JEOR: SCORE: 1532.3

## 2020-12-08 NOTE — DISCHARGE INSTRUCTIONS
DR. IWONA ALMARAZ M.D.   CLINIC PHONE NUMBER [phone].  [facility]      GENERAL ANESTHESIA OR SEDATION ADULT DISCHARGE INSTRUCTIONS   SPECIAL PRECAUTIONS FOR 24 HOURS AFTER SURGERY    IT IS NOT UNUSUAL TO FEEL LIGHT-HEADED OR FAINT, UP TO 24 HOURS AFTER SURGERY OR WHILE TAKING PAIN MEDICATION.  IF YOU HAVE THESE SYMPTOMS; SIT FOR A FEW MINUTES BEFORE STANDING AND HAVE SOMEONE ASSIST YOU WHEN YOU GET UP TO WALK OR USE THE BATHROOM.    YOU SHOULD REST AND RELAX FOR THE NEXT 24 HOURS AND YOU MUST MAKE ARRANGEMENTS TO HAVE SOMEONE STAY WITH YOU FOR AT LEAST 24 HOURS AFTER YOUR DISCHARGE.  AVOID HAZARDOUS AND STRENUOUS ACTIVITIES.  DO NOT MAKE IMPORTANT DECISIONS FOR 24 HOURS.    DO NOT DRIVE ANY VEHICLE OR OPERATE MECHANICAL EQUIPMENT FOR 24 HOURS FOLLOWING THE END OF YOUR SURGERY.  EVEN THOUGH YOU MAY FEEL NORMAL, YOUR REACTIONS MAY BE AFFECTED BY THE MEDICATION YOU HAVE RECEIVED.    DO NOT DRINK ALCOHOLIC BEVERAGES FOR 24 HOURS FOLLOWING YOUR SURGERY.    DRINK CLEAR LIQUIDS (APPLE JUICE, GINGER ALE, 7-UP, BROTH, ETC.).  PROGRESS TO YOUR REGULAR DIET AS YOU FEEL ABLE.    YOU MAY HAVE A DRY MOUTH, A SORE THROAT, MUSCLES ACHES OR TROUBLE SLEEPING.  THESE SHOULD GO AWAY AFTER 24 HOURS.    CALL YOUR DOCTOR FOR ANY OF THE FOLLOWING:  SIGNS OF INFECTION (FEVER, GROWING TENDERNESS AT THE SURGERY SITE, A LARGE AMOUNT OF DRAINAGE OR BLEEDING, SEVERE PAIN, FOUL-SMELLING DRAINAGE, REDNESS OR SWELLING.    IT HAS BEEN OVER 8 TO 10 HOURS SINCE SURGERY AND YOU ARE STILL NOT ABLE TO URINATE (PASS WATER).      HYSTEROSCOPY DISCHARGE INSTRUCTIONS    PLEASE RETURN TO THE CLINIC IN:  ____1 WEEK  ____2 WEEKS  ____4 WEEKS  ____6 WEEKS  MAKE THIS APPOINTMENT AFTER YOU GET HOME IF IT HAS NOT ALREADY BEEN SCHEDULED.    DO NOT DRIVE A CAR, DRINK ALCOHOL OR USE MACHINERY FOR THE NEXT 24 HOURS.  YOU SHOULD WAIT UNTIL YOU HAVE RECOVERED BEFORE MAKING ANY IMPORTANT DECISIONS.    PAIN  YOU MAY HAVE CRAMPS, SHOULDER PAIN OR A LOW BACKACHE  FOR 24 TO 48 HOURS.  TYLENOL (ACETAMINOPHEN) OR MOTRIN (IBUPROFEN) MAY HELP, OR YOUR DOCTOR MAY GIVE YOU PAIN MEDICINE.  CALL YOUR DOCTOR IF PAIN CANNOT BE CONTROLLED.    BLEEDING OR VAGINAL DISCHARGE  YOU MAY HAVE SOME BLEEDING OR DISCHARGE FOR UP TO A WEEK OR LONGER.  DO NOT DOUCHE, USE TAMPONS OR HAVE SEX (INTERCOURSE) FOR ______DAYS.  CALL YOUR DOCTOR IF YOU SOAK MORE THAN ONE MAXI PAD (SANITARY NAPKIN) PER HOUR, OR IF YOU PASS LARGE BLOOD CLOTS.    FEVER  YOU MAY HAVE A LOW FEVER FOR THE FIRST TWO DAYS.  CALL YOUR DOCTOR IF IT GOES OVER 101 DEGREES.    NAUSEA  IF YOU HAVE NAUSEA (FEEL SICK TO YOUR STOMACH), STAY IN BED.  TRY DRINKING A SMALL AMOUNT OF 7-UP, TEA OR SOUP.    SWOLLEN BELLY  IF YOUR ABDOMEN (BELLY AREA) FEELS FIRM OR SWOLLEN, CALL YOUR DOCTOR.    DIZZINESS AND WEAKNESS  YOU MAY FEEL DIZZY OR WEAK FOR A FEW DAYS.  IF SO, YOU SHOULD REST OFTEN, STAND UP SLOWLY AND USE CARE WHEN CLIMBING STAIRS.    DIET AND ACTIVITY  EAT LIGHT MEALS AND DRINK PLENTY OF FLUIDS FOR THE FIRST 24 HOURS (OR LONGER, IF YOU HAVE NAUSEA).  WAIT 5 DAYS BEFORE BATHING.  SHOWERS ARE OKAY.  MOST WOMEN CAN RETURN TO WORK AFTER 24 HOURS.  YOU MAY GO BACK TO YOUR OTHER ACTIVITIES AFTER YOUR PAIN GOES AWAY.        IF YOU HAVE STITCHES  YOU MAY REMOVE YOUR BANDAGE THE DAY AFTER TREATMENT.  YOUR DOCTOR WILL TELL YOU IF YOUR STITCHES NEED TO BE REMOVED.  SOME STITCHES DISSOLVE OVER TIME.           You received Toradol, an IV form of ibuprofen (Motrin) at 8:30 AM.  Do not take any ibuprofen products until 2:30 PM.

## 2020-12-08 NOTE — ANESTHESIA PREPROCEDURE EVALUATION
Anesthesia Pre-Procedure Evaluation    Patient: Simran Maldonado   MRN: [id number] : 1985          Preoperative Diagnosis: Abnormal uterine bleeding [N93.9]  Thickened endometrium [R93.89]    Procedure(s):  OPERATIVE HYSTEROSCOPY WITH INTRAUTERINE MORCELLATOR, DILATION AND CURETTAGE    Past Medical History:   Diagnosis Date     Abdominal pain      Abnormal menses      Complication of anesthesia      PONV (postoperative nausea and vomiting)      Past Surgical History:   Procedure Laterality Date     HYSTEROSCOPY,REMOVE MYOMA      At [facility]?     Anesthesia Evaluation     . Pt has had prior anesthetic.     History of anesthetic complications   - PONV        ROS/MED HX    ENT/Pulmonary:      (-) sleep apnea   Neurologic:       Cardiovascular:  - neg cardiovascular ROS       METS/Exercise Tolerance:     Hematologic:         Musculoskeletal:         GI/Hepatic:        (-) GERD   Renal/Genitourinary:         Endo:     (+) Obesity, .      Psychiatric:         Infectious Disease:         Malignancy:         Other:                          Physical Exam      Airway   Mallampati: II  TM distance: >3 FB  Neck ROM: full    Dental     Cardiovascular   Rhythm and rate: regular and normal      Pulmonary    breath sounds clear to auscultation            Lab Results   Component Value Date    WBC 6.2 10/30/2020    HGB 13.6 10/30/2020    HCT 42.0 10/30/2020     10/30/2020     2019    POTASSIUM 4.1 2019    CHLORIDE 112 (H) 2019    CO2 22 2019    BUN 11 2019    CR 0.58 2019    GLC 88 2019    EVON 8.1 (L) 2019    ALBUMIN 3.5 2019    PROTTOTAL 7.1 2019    ALT 26 2019    AST 16 2019    ALKPHOS 109 2019    BILITOTAL 0.2 2019    TSH 0.61 10/30/2020       Preop Vitals  BP Readings from Last 3 Encounters:   20 103/64   20 100/78   20 118/76    Pulse Readings from Last 3 Encounters:   20 67   10/30/20 57    07/25/19 74      Resp Readings from Last 3 Encounters:   12/08/20 14   12/04/20 16   10/30/20 16    SpO2 Readings from Last 3 Encounters:   12/08/20 97%   12/04/20 98%   10/30/20 97%      Temp Readings from Last 1 Encounters:   12/08/20 97.4  F (36.3  C) (Temporal)    Ht Readings from Last 1 Encounters:   12/08/20 1.524 m (5')      Wt Readings from Last 1 Encounters:   12/08/20 92.1 kg (203 lb)    Estimated body mass index is 39.65 kg/m  as calculated from the following:    Height as of this encounter: 1.524 m (5').    Weight as of this encounter: 92.1 kg (203 lb).       Anesthesia Plan      History & Physical Review  History and physical reviewed and following examination; no interval change.    ASA Status:  3 .    NPO Status:  > 8 hours    Plan for General with Intravenous and Propofol induction. Maintenance will be Balanced.    PONV prophylaxis:  Ondansetron (or other 5HT-3), Dexamethasone or Solumedrol and Scopolamine patch         Postoperative Care  Postoperative pain management:  IV analgesics, Oral pain medications and Multi-modal analgesia.      Consents  Anesthetic plan, risks, benefits and alternatives discussed with:  Patient..                 Lamont Beckham MD                    .

## 2020-12-08 NOTE — OP NOTE
Operative Note    PREOPERATIVE DIAGNOSIS: Abnormal uterine bleeding, Thickened endometrium.   POSTOPERATIVE DIAGNOSIS: Endometrial polyp.   PROCEDURE: Hysteroscopic endometrial polypectomy with intrauterine morcellator, Dilatation and curettage.   SURGEON: Gopi Sy MD   ANESTHESIA: General endotracheal anesthesia.   FLUIDS: 500 cc lactated Ringer's, Ancef 2 gm and uterine distention medium was normal saline with 130 cc deficit total.   ESTIMATED BLOOD LOSS: 5 cc   DRAINS: None.     INDICATIONS FOR PROCEDURE: Patient is a 34 year old woman who has had abnormal uterine bleeding recently.  She had an ultrasound showing a possible thickened endometrium and possible polyp, but otherwise a normal uterus and normal adnexa. Therefore, the patient presents today for operative hysteroscopy to resect any anatomic lesions as well as a dilatation and curettage to assess the endometrium, confirm histologic diagnosis, and hopefully resolve her symptoms. The patient understands the indication for the procedure as well as the potential risks such as bleeding, infection, injury to the cervix, uterus, tubes, ovaries, bowel, bladder, any other intra-abdominal or pelvic organs as well as the risk of fluid overload and electrolyte imbalance that can occur in an operative hysteroscopy. She accepts all these risks, as well as the risks of anesthesia including aspiration, malignant hyperthermia and death. She has given informed consent.     FINDINGS: On pelvic exam under anesthesia, the uterus is normal size, shape, contour, anteverted. There are no adnexal masses. On hysteroscopy, there is a 1 cm endometrial polyp arising from anterior upper uterine wall. Both tubal ostia appeared normal.   SPECIMENS: 1) Endocervical curetting, 2) Endometrial polyp, 3) Endometrial curetting.   COMPLICATIONS: None.     PROCEDURE IN DETAIL: After proper patient identification and consent for procedure was obtained, the patient was taken to the  operating room where adequate general endotracheal anesthesia was obtained. The patient was placed in the dorsal lithotomy position with legs in Yvon stirrups and pelvic exam under anesthesia was performed with the above-noted findings. She was prepped and draped in the usual sterile manner for this procedure. A straight catheter was used to drain the bladder. A single-arm speculum was placed in the vagina to visualize the cervix, which was then grasped at 12 o'clock with a single-tooth tenaculum for downward traction of the uterus. An endocervical curettage was performed, and specimen sent to pathology. Using Hegar dilators, the cervix was gently dilated to a #6 Hegar dilator without difficulty. The Myosure operative hysteroscope was inserted into the endometrial cavity without difficulty under direct visualization.  Normal saline was used under continuous flow as a uterine distention medium. Fluid management and volumes were monitored with the 17u.cnilegAuto fluid management system. The above-noted findings were ascertained. Using the Myosure intrauterine morcellator, the above noted polyp was resected without difficulty. The saline was evacuated from the uterus and the scope was removed. A sharp curettage was performed throughout all quadrants, and specimen was sent to pathology. The tenaculum was removed and the cervix and uterus were hemostatic at the end.   Patient tolerated the procedure well. Sponge and instrument counts were correct x2. The patient was taken to the recovery room and extubated in stable condition.     Gopi Sy MD

## 2020-12-08 NOTE — PROGRESS NOTES
SPIRITUAL HEALTH SERVICES  [facility]  PRE-SURGERY VISIT    Pre-surgical visit with pt assisted by .    Provided spiritual support, prayer. Pt is Orthodox.  Oriented to Spiritual Health Services and availability for emotional/spiritual support during hospital stay.         Veto Porras MA  Staff   Pager: [phone]  Phone: [phone]

## 2020-12-08 NOTE — ANESTHESIA CARE TRANSFER NOTE
Patient: Simran Maldonado    Procedure(s):  OPERATIVE HYSTEROSCOPY WITH INTRAUTERINE MORCELLATOR, DILATION AND CURETTAGE    Diagnosis: Abnormal uterine bleeding [N93.9]  Thickened endometrium [R93.89]  Diagnosis Additional Information: No value filed.    Anesthesia Type:   General     Note:  Airway :Face Mask  Patient transferred to:PACU  Comments: Patient with spontaneous respirations and adequate tidal volumes. Patient awake and responsive. LMA removed in OR to 6 L facemask. To PACU ventilating well. VSS. Report given.Handoff Report: Identifed the Patient, Identified the Reponsible Provider, Reviewed the pertinent medical history, Discussed the surgical course, Reviewed Intra-OP anesthesia mangement and issues during anesthesia, Set expectations for post-procedure period and Allowed opportunity for questions and acknowledgement of understanding      Vitals: (Last set prior to Anesthesia Care Transfer)    CRNA VITALS  12/8/2020 0747 - 12/8/2020 0821      12/8/2020             SpO2:  97 %                Electronically Signed By: GRAHAM Hudson CRNA  December 8, 2020  8:21 AM

## 2020-12-08 NOTE — ANESTHESIA PROCEDURE NOTES
Airway   Date/Time: 12/8/2020 7:39 AM   Patient location during procedure: OR    Staff -   Anesthesiologist:  Lamont Beckham MD  CRNA: Milad Tamez APRN CRNA  Performed By: CRNA    Consent for Airway   Urgency: elective    Indications and Patient Condition  Indications for airway management: tim-procedural  Induction type:intravenousMask difficulty assessment: 1 - vent by mask    Final Airway Details  Final airway type: supraglottic airway    Endotracheal Airway Details   Secured with: plastic tape    Post intubation assessment   Placement verified by: capnometry, equal breath sounds and chest rise   Number of attempts at approach: 1  Number of other approaches attempted: 0  Secured with:plastic tape  Ease of procedure: easy  Dentition: Intact and Unchanged

## 2020-12-11 PROBLEM — N85.02 ENDOMETRIAL HYPERPLASIA WITH ATYPIA: Status: ACTIVE | Noted: 2020-12-08

## 2020-12-11 LAB — COPATH REPORT: NORMAL

## 2020-12-16 ENCOUNTER — APPOINTMENT (OUTPATIENT)
Dept: INTERPRETER SERVICES | Facility: CLINIC | Age: 35
End: 2020-12-16
Payer: COMMERCIAL

## 2020-12-17 ENCOUNTER — APPOINTMENT (OUTPATIENT)
Dept: INTERPRETER SERVICES | Facility: CLINIC | Age: 35
End: 2020-12-17
Payer: COMMERCIAL

## 2020-12-29 ENCOUNTER — OFFICE VISIT (OUTPATIENT)
Dept: OBGYN | Facility: CLINIC | Age: 35
End: 2020-12-29
Payer: COMMERCIAL

## 2020-12-29 VITALS
BODY MASS INDEX: 40.44 KG/M2 | WEIGHT: 206 LBS | DIASTOLIC BLOOD PRESSURE: 62 MMHG | HEIGHT: 60 IN | SYSTOLIC BLOOD PRESSURE: 110 MMHG

## 2020-12-29 DIAGNOSIS — N85.02 ENDOMETRIAL HYPERPLASIA WITH ATYPIA: Primary | ICD-10-CM

## 2020-12-29 DIAGNOSIS — Z31.69 PROCREATIVE MANAGEMENT COUNSELING: ICD-10-CM

## 2020-12-29 PROCEDURE — 99213 OFFICE O/P EST LOW 20 MIN: CPT | Performed by: OBSTETRICS & GYNECOLOGY

## 2020-12-29 RX ORDER — PRENATAL VIT/IRON FUM/FOLIC AC 27MG-0.8MG
1 TABLET ORAL DAILY
Qty: 90 TABLET | Refills: 3 | Status: SHIPPED | OUTPATIENT
Start: 2020-12-29 | End: 2021-06-20

## 2020-12-29 ASSESSMENT — MIFFLIN-ST. JEOR: SCORE: 1540.91

## 2020-12-29 NOTE — PROGRESS NOTES
SUBJECTIVE:                                                   Simran Maldonado is a 35 year old female who presents to clinic today with the Chief Complaint of:  Patient presents with:  Follow Up: Hysteroscopy D&C     I communicated with Pt via  because Pt speaks no/limited English.    Pt here to f/u her recent Op Hyst w/ IUM, D&C 2020 for AUB and thickened endometrium on U/S.  Findings showed an endometrial polyp.  Path on the polyp showed Atypical hyperplasia but not specified if simple or complex.  However the pathologist did mention that hormonal management being warranted with progestins.  Pt had a endometrial curettings of the rest of the cavity showing proliferative phase endometrium and the ECC was negative.  I reviewed all this with the Pt and its clinical significance, Rx options, rationale for options, etc.      Patient's last menstrual period was 2020..   Patient is sexually active, .  Using none for contraception.    reports that she has never smoked. She has never used smokeless tobacco.    Problem list and histories reviewed & adjusted, as indicated.  Additional history: as documented.    Patient Active Problem List   Diagnosis     Abnormal thyroid stimulating hormone level     FH: breast cancer in first degree relative     Class 2 obesity due to excess calories without serious comorbidity in adult     Abnormal uterine bleeding     Thickened endometrium     Endometrial hyperplasia with atypia     Past Surgical History:   Procedure Laterality Date     DILATION AND CURETTAGE, OPERATIVE HYSTEROSCOPY WITH MORCELLATOR, COMBINED N/A 2020    Procedure: OPERATIVE HYSTEROSCOPY WITH INTRAUTERINE MORCELLATOR, DILATION AND CURETTAGE;  Surgeon: Gopi Sy MD;  Location: [facility] OR     HYSTEROSCOPY,REMOVE MYOMA      At [facility] [place]?      Social History     Tobacco Use     Smoking status: Never Smoker     Smokeless tobacco: Never Used   Substance Use Topics     Alcohol  use: No      Problem (# of Occurrences) Relation (Name,Age of Onset)    Breast Cancer (1) Sister (47)    Cancer (1) Paternal Grandfather    Diabetes (2) Other (Mother), Mother    Liver Cancer (1) Father    Uterine Cancer (1) Sister            Current Outpatient Medications   Medication Sig     ibuprofen (ADVIL/MOTRIN) 600 MG tablet Take 1 tablet (600 mg) by mouth every 6 hours as needed for pain Take w/ food     Prenatal Vit-Fe Fumarate-FA (PRENATAL MULTIVITAMIN W/IRON) 27-0.8 MG tablet Take 1 tablet by mouth daily     vitamin D2 (ERGOCALCIFEROL) 1.25 MG (15258 UT) capsule Take 1 capsule (50,000 Units) by mouth once a week     No current facility-administered medications for this visit.      No Known Allergies    ROS:  Review of Systems  Deferred      OBJECTIVE:     /62 (BP Location: Right arm, Patient Position: Sitting, Cuff Size: Adult Regular)   Ht 1.524 m (5')   Wt 93.4 kg (206 lb)   LMP 12/02/2020   BMI 40.23 kg/m    Body mass index is 40.23 kg/m .    Exam:  Physical Exam  Constitutional:       Appearance: She is obese.   Neurological:      Mental Status: She is alert.         Prior Pertinent Lab Results Reviewed:  Admission on 12/08/2020, Discharged on 12/08/2020   Component Date Value Ref Range Status     Hemoglobin 12/08/2020 12.2  11.7 - 15.7 g/dL Final     HCG Qual Urine 12/08/2020 Negative  NEG^Negative Final    Comment: This test is for screening purposes.  Results should be interpreted along with   the clinical picture.  Confirmation testing is available if warranted by   ordering [id number], HCG Quantitative Pregnancy.       Copath Report 12/08/2020    Final                    Value:Patient Name: ANABELL HARRIS  MR#: [id number]  Specimen #: [id number]  Collected: 12/8/2020  Received: 12/8/2020  Reported: 12/11/2020 11:35  Ordering Phy(s): IWONA ALMARAZ    For improved result formatting, select 'View Enhanced Report Format' under   Linked Documents section.    SPECIMEN(S):  A:  "Endocervical curettings  B: Endometrial polyp  C: Endometrial curettings    FINAL DIAGNOSIS:  A: Endocervix, curettings-  -Negative for squamous intraepithelial lesion, glandular dysplasia, and   malignancy.  -Sampling includes: Fragments of ectocervical mucosa and endocervical   glands.  -See comment.    B: Endometrium, polyp, biopsy/polypectomy-  -Atypical endometrial hyperplasia.  -Fragments of endometrial polyp(s).  -See comment.    C: Endometrium, curettings-  -Proliferative-type endometrium with ciliated (tubal) metaplasia.  -Fragments of benign endometrial polyp(s).    COMMENT:  Atypical endometrial hyperplasia is present. Hormonal management of this   condition is warranted,                           as clinically  seen appropriate. The possibility that this could be part of the patient's   polycystic ovary syndrome (PCOS),  if this diagnosis is confirmed, is considered. Initial progesterone trial   of therapy could also be appropriate  in this case.    Specimen B is seen in consultation with Dariel Gutierrez and [place]   (Gynecologic Pathologists), and Dr. Simons who  concur with the diagnosis.    The patient's last Pap smear ([id number]; obtained 5/2019) was reportedly   negative.  The findings of current  endocervical curettings correlate with the Pap smear.    Electronically signed out by:    Ann Pritchett M.D.    CLINICAL HISTORY:  Abnormal uterine bleeding, thickened endometrium.    GROSS:  A. The specimen is received in formalin, labeled with the patient's name   and date of birth, and designated  \"endocervical curettings\". It consists of a 2.0 x 1.2 x 0.2 cm aggregate   of red-tan, mucoid material. Wrapped  and entirely submitted in one cassette.    B. The specimen is receive                          d in formalin, labeled with the patient's name   and date of birth, and designated  \"endometrial polyp\". It consists of a 2.5 x 2.0 x 0.8 cm aggregate of pink   red focally hemorrhagic soft " "tissue  fragments. Wrapped and entirely submitted in one cassette.    C. The specimen is received in formalin, labeled with the patient's name   and date of birth, and designated  \"endometrial curettings\". It consists of a 2.0 x 2.0 x 0.3 cm aggregate of   red hemorrhagic tissue fragments.  Wrapped and entirely submitted in one cassette. (Dictated by: ADRIENNE Bergman([facility]) 12/8/2020 09:19 AM)    MICROSCOPIC:  A, B, and C.  Microscopic examination is performed.    The technical component of this testing was completed at the [facility], with the professional component performed   at the [facility], [address], [place]  [address]   ([phone])    CPT Codes:  A: 48663-HB7  B: 20551-TT7  C                          : 48202-IH5    COLLECTION SITE:  Client: [facility]  Location: [facility] ([facility])           ASSESSMENT:                                                      Encounter Diagnoses   Name Primary?     Endometrial hyperplasia with atypia Yes     Procreative management counseling          PLAN:                                                      1) I spent most of today's visit counseling Pt re: the Dx, Rx options, and rationale.  Pt given initial recommendation of hysterectomy with bilateral salpingectomy due to risk of developing endometrial CA over her lifetime, and possibly in the next few years, as well as the small risk that there is already an undetected endometrial CA present that wasn't found on the D&C.  Pt however wishes to have 1 more child, so hysterectomy is not an option for her at this time if she can avoid it.  I then reviewed option of a Mirena IUD for 6 months, then repeating the Op Hyst w/ IUM, D&C to confirm regression of any hyperplasia.  Pt does not want to do that and wishes to try to conceive right away.  I did advise her that the high progestin levels in pregnancy will likely " protect her endometrium from progressing to CA if not already there, but that given she has possible PCOS based on her U/S findings as well as her prior Hx of years of irregular menses, as well as the hyperplasia, she is likely chronically anovulatory.  Regardless, she is adamant she wants to at least try to conceive.    2)  I advised Pt to give herself 3 cycles/months to conceive, and if unsuccessful she should either undergo infertility evaluation with one of my partners who manages infertility, or else forgo pregnancy for now and get a Mirena IUD for 6 months with the same plan as outlined above for repeat sampling of the endometrium.  She can also elect to just have the hysterectomy at any time.  I also reviewed robotic TLH, Robin Uribe briefly, and she can call if she changes her mind and wants to do that.    3)  I spent 18 minutes counseling time out of a total visit time of 18 minutes in direct face-to-face counseling and discussion of the above issues with the patient.      Gopi Sy MD  [facility]

## 2020-12-29 NOTE — NURSING NOTE
Chief Complaint   Patient presents with     Follow Up     Hysteroscopy D&C        Initial /62 (BP Location: Right arm, Patient Position: Sitting, Cuff Size: Adult Regular)   Ht 1.524 m (5')   Wt 93.4 kg (206 lb)   LMP 2020   BMI 40.23 kg/m   Estimated body mass index is 40.23 kg/m  as calculated from the following:    Height as of this encounter: 1.524 m (5').    Weight as of this encounter: 93.4 kg (206 lb).  BP completed using cuff size: large    Questioned patient about current smoking habits.  Pt. has never smoked.          The following HM Due: NONE    Isaiah Ruvalcaba CMA

## 2021-04-01 ENCOUNTER — TELEPHONE (OUTPATIENT)
Dept: OBGYN | Facility: CLINIC | Age: 36
End: 2021-04-01

## 2021-04-01 NOTE — TELEPHONE ENCOUNTER
Through [place]  Services [phone], I called patient re: upcomming appointment with Dr. Brody. She is unable to talk on the phone right now, so I advised her to call back and ask for the nurse.      If she is trying to conceive she should change her appointment to a provider that treats infertility.  Dr. Alex has openings 4/20 in [place].    See Dr. Sy's previous note.     Janae Canales CMA

## 2021-04-05 ENCOUNTER — APPOINTMENT (OUTPATIENT)
Dept: INTERPRETER SERVICES | Facility: CLINIC | Age: 36
End: 2021-04-05
Payer: COMMERCIAL

## 2021-04-05 NOTE — TELEPHONE ENCOUNTER
Through [place]  [facility] [phone], I left a message on answering machine for patient to call back and ask for the nurse re: a message in her chart  Janae Canales CMA

## 2021-04-07 ENCOUNTER — OFFICE VISIT (OUTPATIENT)
Dept: OBGYN | Facility: CLINIC | Age: 36
End: 2021-04-07
Payer: COMMERCIAL

## 2021-04-07 ENCOUNTER — APPOINTMENT (OUTPATIENT)
Dept: INTERPRETER SERVICES | Facility: CLINIC | Age: 36
End: 2021-04-07
Payer: COMMERCIAL

## 2021-04-07 VITALS — BODY MASS INDEX: 39.26 KG/M2 | SYSTOLIC BLOOD PRESSURE: 116 MMHG | DIASTOLIC BLOOD PRESSURE: 78 MMHG | WEIGHT: 201 LBS

## 2021-04-07 DIAGNOSIS — N85.02 ENDOMETRIAL HYPERPLASIA WITH ATYPIA: Primary | ICD-10-CM

## 2021-04-07 PROCEDURE — 99213 OFFICE O/P EST LOW 20 MIN: CPT | Performed by: OBSTETRICS & GYNECOLOGY

## 2021-04-07 NOTE — NURSING NOTE
Chief Complaint   Patient presents with     Abnormal Uterine Bleeding     initial /78   Wt 91.2 kg (201 lb)   LMP 03/31/2021 (Exact Date)   BMI 39.26 kg/m   Estimated body mass index is 39.26 kg/m  as calculated from the following:    Height as of 12/29/20: 1.524 m (5').    Weight as of this encounter: 91.2 kg (201 lb).  BP completed using cuff size regular sharan Canales CMA

## 2021-04-07 NOTE — PROGRESS NOTES
"Chief Complaint   Patient presents with     Abnormal Uterine Bleeding     I communicated with the patient using a     Subjective:  Wants to discuss surgical findings and options of management.  \"I was overwhelmed when I spoke with the other Dr.\"    She underwent hysteroscopy, D&C in 12/2020 with the notable finding of atypical endometrial hyperplasia.  Dr. Sy presented the findings and suggested either progestin therapy as recommended by pathology, hysterectomy due to the concern for occult cancer or future endometrial cancer development or close follow up if she wishes to conceive.  She had remained amenorrheic since surgery with numerous neg pregnancy tests prompting the scheduling of today's visit.    She subsequently had a normal heavy menses on 3/31 but kept the appoinment to discuss options again.  She desires an U/S to see what the uterus looks like now.    REVIEW OF SYSTEMS:  Neg except as noted above    Health Maintenance   Topic Date Due     ADVANCE CARE PLANNING  Never done     COVID-19 Vaccine (1) Never done     HEPATITIS C SCREENING  Never done     INFLUENZA VACCINE (1) 09/01/2020     PHQ-2  01/01/2021     PREVENTIVE CARE VISIT  10/30/2021     DTAP/TDAP/TD IMMUNIZATION (3 - Td) 07/26/2023     HPV TEST  05/09/2024     PAP  05/09/2024     HIV SCREENING  Completed     Pneumococcal Vaccine: Pediatrics (0 to 5 Years) and At-Risk Patients (6 to 64 Years)  Aged Out     IPV IMMUNIZATION  Aged Out     MENINGITIS IMMUNIZATION  Aged Out     HEPATITIS B IMMUNIZATION  Aged Out       No Known Allergies    Objective:  Vitals: /78   Wt 91.2 kg (201 lb)   LMP 03/31/2021 (Exact Date)   BMI 39.26 kg/m    BMI= Body mass index is 39.26 kg/m .    Deferred    Assessment/Plan:  1. Endometrial hyperplasia with atypia    - US Pelvic Complete with Transvaginal; Future      Patient understands that close interval follow up is critical.  The option of ovulation induction can be pursued with a provider " who does infertility management.  She is aware of the hormonal and surgical options if she chooses to suspend further attempts at conception.    Serafin Brody MD

## 2021-04-13 ENCOUNTER — ANCILLARY PROCEDURE (OUTPATIENT)
Dept: ULTRASOUND IMAGING | Facility: CLINIC | Age: 36
End: 2021-04-13
Attending: OBSTETRICS & GYNECOLOGY
Payer: COMMERCIAL

## 2021-04-13 DIAGNOSIS — N85.02 ENDOMETRIAL HYPERPLASIA WITH ATYPIA: ICD-10-CM

## 2021-04-13 PROCEDURE — 76830 TRANSVAGINAL US NON-OB: CPT | Performed by: OBSTETRICS & GYNECOLOGY

## 2021-04-13 PROCEDURE — 76856 US EXAM PELVIC COMPLETE: CPT | Performed by: OBSTETRICS & GYNECOLOGY

## 2021-05-27 ENCOUNTER — OFFICE VISIT (OUTPATIENT)
Dept: INTERNAL MEDICINE | Facility: CLINIC | Age: 36
End: 2021-05-27
Payer: COMMERCIAL

## 2021-05-27 VITALS
TEMPERATURE: 96.5 F | DIASTOLIC BLOOD PRESSURE: 86 MMHG | HEIGHT: 60 IN | WEIGHT: 203.1 LBS | SYSTOLIC BLOOD PRESSURE: 126 MMHG | OXYGEN SATURATION: 97 % | BODY MASS INDEX: 39.87 KG/M2 | HEART RATE: 60 BPM

## 2021-05-27 DIAGNOSIS — R51.9 ACUTE INTRACTABLE HEADACHE, UNSPECIFIED HEADACHE TYPE: Primary | ICD-10-CM

## 2021-05-27 PROCEDURE — 99213 OFFICE O/P EST LOW 20 MIN: CPT | Performed by: INTERNAL MEDICINE

## 2021-05-27 ASSESSMENT — MIFFLIN-ST. JEOR: SCORE: 1527.76

## 2021-05-27 NOTE — PATIENT INSTRUCTIONS
Acetaminophen 325-500 mg tablets 1-2 tablets every 8 hours as needed, alternate with ibuprofen 200 mg tablets , take 2 every 8 hours as needed.

## 2021-05-27 NOTE — PROGRESS NOTES
Assessment & Plan     Acute intractable headache, unspecified headache type  Differential here includes part of a viral syndrome,  atypical migraine    -She does not have any concerning symptoms and given her negative Covid test from a few days ago this is less likely.  I recommend that she treat this symptomatically for now.  Instructions given within AVS for symptomatic control.  Any worsening symptoms develop contact us or proceed for acute evaluation      I spent a total of 22 minutes on the day of the visit.   Time spent doing chart review, history and exam, documentation and further activities per the note       BMI:   Estimated body mass index is 39.67 kg/m  as calculated from the following:    Height as of this encounter: 1.524 m (5').    Weight as of this encounter: 92.1 kg (203 lb 1.6 oz).       See Patient Instructions    No follow-ups on file.    Ollie Arndt MD  [facility] InstituteMIAH Khalil is a 35 year old who presents for the following health issues  accompanied by her :    HPI     Acute Illness  Acute illness concerns: concern for covid  Onset/Duration: 6+ days  Symptoms:  Fever: no  Chills/Sweats: YES  Headache (location?): YES  Sinus Pressure: no  Conjunctivitis:  no  Ear Pain: no  Rhinorrhea: no  Congestion: no  Sore Throat: YES  Cough: YES- resolving  Wheeze: no  Decreased Appetite: no  Nausea: no  Vomiting: no  Diarrhea: no  Dysuria/Freq.: no  Dysuria or Hematuria: no  Fatigue/Achiness: YES  Sick/Strep Exposure: no  Pt states saliva COVID test  Negative Monday  Therapies tried and outcome: None      Review of Systems         Objective    /86   Pulse 60   Temp 96.5  F (35.8  C) (Temporal)   Ht 1.524 m (5')   Wt 92.1 kg (203 lb 1.6 oz)   SpO2 97%   BMI 39.67 kg/m    Body mass index is 39.67 kg/m .  Physical Exam   GENERAL APPEARANCE: alert, no distress and over weight  HENT: nose and mouth without ulcers or lesions and normal  cephalic/atraumatic  NECK: no adenopathy, no asymmetry, masses, or scars and thyroid normal to palpation  RESP: lungs clear to auscultation - no rales, rhonchi or wheezes  CV: regular rates and rhythm, normal S1 S2, no S3 or S4 and no murmur, click or rub

## 2021-06-20 PROBLEM — E66.09 CLASS 2 OBESITY DUE TO EXCESS CALORIES WITHOUT SERIOUS COMORBIDITY IN ADULT: Status: RESOLVED | Noted: 2020-11-20 | Resolved: 2021-06-20

## 2021-06-20 PROBLEM — N85.02 ENDOMETRIAL HYPERPLASIA WITH ATYPIA: Status: RESOLVED | Noted: 2020-12-08 | Resolved: 2021-06-20

## 2021-06-20 PROBLEM — E66.812 CLASS 2 OBESITY DUE TO EXCESS CALORIES WITHOUT SERIOUS COMORBIDITY IN ADULT: Status: RESOLVED | Noted: 2020-11-20 | Resolved: 2021-06-20

## 2021-06-20 PROBLEM — R93.89 THICKENED ENDOMETRIUM: Status: RESOLVED | Noted: 2020-11-20 | Resolved: 2021-06-20

## 2021-06-20 PROBLEM — N93.9 ABNORMAL UTERINE BLEEDING: Status: RESOLVED | Noted: 2020-11-20 | Resolved: 2021-06-20

## 2022-02-15 ENCOUNTER — LAB REQUISITION (OUTPATIENT)
Dept: LAB | Facility: CLINIC | Age: 37
End: 2022-02-15

## 2022-02-15 LAB
PATH REPORT.COMMENTS IMP SPEC: NORMAL
PATH REPORT.FINAL DX SPEC: NORMAL
PATH REPORT.GROSS SPEC: NORMAL
PATH REPORT.MICROSCOPIC SPEC OTHER STN: NORMAL
PATH REPORT.RELEVANT HX SPEC: NORMAL
PATH REPORT.RELEVANT HX SPEC: NORMAL
PATH REPORT.SITE OF ORIGIN SPEC: NORMAL

## 2022-02-18 ENCOUNTER — TRANSCRIBE ORDERS (OUTPATIENT)
Dept: OTHER | Age: 37
End: 2022-02-18
Payer: COMMERCIAL

## 2022-02-18 ENCOUNTER — APPOINTMENT (OUTPATIENT)
Dept: INTERPRETER SERVICES | Facility: CLINIC | Age: 37
End: 2022-02-18
Payer: COMMERCIAL

## 2022-02-18 DIAGNOSIS — N85.02 ENDOMETRIAL HYPERPLASIA WITH ATYPIA: Primary | ICD-10-CM

## 2022-02-22 ENCOUNTER — APPOINTMENT (OUTPATIENT)
Dept: INTERPRETER SERVICES | Facility: CLINIC | Age: 37
End: 2022-02-22
Payer: COMMERCIAL

## 2022-02-22 ENCOUNTER — TELEPHONE (OUTPATIENT)
Dept: OBGYN | Facility: CLINIC | Age: 37
End: 2022-02-22
Payer: COMMERCIAL

## 2022-02-22 NOTE — TELEPHONE ENCOUNTER
Called gyn/onc  and explained situation.  They will reach out to patient to schedule.    Called Simran with assist of .  Advised that this was scheduled in the wrong clinic and Gyn/Onc clinic will be reaching out to her in the next couple of days to schedule appropriately.    Pt indicated understanding and agreed with plan.

## 2022-02-22 NOTE — TELEPHONE ENCOUNTER
----- Message from Dana Tenorio MD sent at 2/22/2022  3:36 PM CST -----  Regarding: Needs Gyn onc not [facility]  This patient is scheduled on 3/29 with Dr. Gutierrez- really meant to be with Gyn Onc. Can you help in rescheduling??

## 2022-02-25 ENCOUNTER — DOCUMENTATION ONLY (OUTPATIENT)
Dept: ONCOLOGY | Facility: CLINIC | Age: 37
End: 2022-02-25
Payer: COMMERCIAL

## 2022-02-25 NOTE — PROGRESS NOTES
Action February 25, 2022 8:55 AM ABT   Action Taken Records updated in [facility], image request sent to [facility]

## 2022-04-18 ENCOUNTER — TRANSCRIBE ORDERS (OUTPATIENT)
Dept: OTHER | Age: 37
End: 2022-04-18
Payer: COMMERCIAL

## 2022-04-18 ENCOUNTER — PATIENT OUTREACH (OUTPATIENT)
Dept: ONCOLOGY | Facility: CLINIC | Age: 37
End: 2022-04-18
Payer: COMMERCIAL

## 2022-04-18 DIAGNOSIS — N85.02 ENDOMETRIAL HYPERPLASIA WITH ATYPIA: Primary | ICD-10-CM

## 2022-04-18 NOTE — PROGRESS NOTES
New Patient Hematology / Oncology Nurse Navigator Note     Referral Date: 4/18/22    Referring provider:   Safia Arredondo MD    [address] [place] [id number]    Phone: [phone]    Fax: [phone]      Referring Clinic/Organization: [facility] (OBGYN)     Referred to: GynOnc    Requested provider (if applicable): First available - did not specify     Evaluation for : Endometrial hyperplasia with atypia       Clinical History (per Nurse review of records provided):       2/11 path-- BOOKMARKED     2/15 path-- BOOKMARKED     4/13/21, 11/6/20 Pelvic US-- BOOKMARKED    Clinical Assessment / Barriers to Care (Per Nurse):    Kazakh speaking     Records Location: [facility] Everywhere     Records Needed:     Pathology, imaging       Referral updates and Plan:     Attempted to reach patient x2 via  to confirm referral has been received. Introduced self and role as nurse navigator with GynOnc. Provided contact information for clinic. Will route referral to scheduling to complete registration and schedule patient in next opening per location preference.     Micki Chopra, AYLINN, RN, PHN, OCN  Hematology/Oncology Nurse Navigator  [facility]  [phone]

## 2022-04-19 NOTE — PROGRESS NOTES
RECORDS STATUS - ALL OTHER DIAGNOSIS      Action    Action Taken 4/19/2022 12:33pm AVERY SANCHEZ faxed a [place] [facility]    4/20/2022 10:11AM AVERY SANCHEZ faxed a request for imaging to [facility] again    4/21/2022 11:21AM AVERY SANCHEZ resolved imaging in PACS    1:21pm AVERY SANCHEZ faxed a request for Bx slides to [facility]     RECORDS RECEIVED FROM: [facility]   DATE RECEIVED: 4/22/2022   NOTES STATUS DETAILS   OFFICE NOTE from referring provider     OFFICE NOTE from medical oncologist Complete    DISCHARGE SUMMARY from hospital     DISCHARGE REPORT from the ER Complete 2/23/2022- Vaginal Bleeding    OPERATIVE REPORT Complete 11/20/2020    CLINICAL TRIAL TREATMENTS TO DATE     LABS     PATHOLOGY REPORTS Requested- [facility] ([facility])   Sent an ib-message to Dr. 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      ANYTHING RELATED TO DIAGNOSIS Complete CE- Labs last updated on 4/15/2022   GENONOMIC TESTING     TYPE:     IMAGING (NEED IMAGES & REPORT)     CT SCANS     MRI     MAMMO     ULTRASOUND Complete    Complete- [facility] US pelvic Complete 4/13/2021, 11/6/2020    ULT Pelvic Non-OB Complete 1/5/2022   PET

## 2022-04-22 ENCOUNTER — ONCOLOGY VISIT (OUTPATIENT)
Dept: ONCOLOGY | Facility: CLINIC | Age: 37
End: 2022-04-22
Attending: OBSTETRICS & GYNECOLOGY
Payer: COMMERCIAL

## 2022-04-22 ENCOUNTER — PRE VISIT (OUTPATIENT)
Dept: ONCOLOGY | Facility: CLINIC | Age: 37
End: 2022-04-22

## 2022-04-22 VITALS
HEIGHT: 59 IN | DIASTOLIC BLOOD PRESSURE: 78 MMHG | BODY MASS INDEX: 41.04 KG/M2 | TEMPERATURE: 98.4 F | RESPIRATION RATE: 20 BRPM | OXYGEN SATURATION: 99 % | WEIGHT: 203.6 LBS | SYSTOLIC BLOOD PRESSURE: 112 MMHG | HEART RATE: 71 BPM

## 2022-04-22 DIAGNOSIS — N85.02 EIN (ENDOMETRIAL INTRAEPITHELIAL NEOPLASIA): Primary | ICD-10-CM

## 2022-04-22 LAB — HCG UR QL: NEGATIVE

## 2022-04-22 PROCEDURE — 58300 INSERT INTRAUTERINE DEVICE: CPT

## 2022-04-22 PROCEDURE — 58300 INSERT INTRAUTERINE DEVICE: CPT | Performed by: OBSTETRICS & GYNECOLOGY

## 2022-04-22 PROCEDURE — 81025 URINE PREGNANCY TEST: CPT | Performed by: STUDENT IN AN ORGANIZED HEALTH CARE EDUCATION/TRAINING PROGRAM

## 2022-04-22 PROCEDURE — 99205 OFFICE O/P NEW HI 60 MIN: CPT | Mod: 25 | Performed by: OBSTETRICS & GYNECOLOGY

## 2022-04-22 PROCEDURE — 250N000011 HC RX IP 250 OP 636: Performed by: OBSTETRICS & GYNECOLOGY

## 2022-04-22 RX ORDER — CHOLECALCIFEROL (VITAMIN D3) 25 MCG
1 TABLET ORAL DAILY
COMMUNITY
Start: 2022-01-27

## 2022-04-22 RX ORDER — IBUPROFEN 600 MG/1
600 TABLET, FILM COATED ORAL
COMMUNITY
Start: 2022-02-11

## 2022-04-22 RX ORDER — ACETAMINOPHEN 325 MG/1
325-650 TABLET ORAL
COMMUNITY
Start: 2022-02-11

## 2022-04-22 RX ORDER — PRENATAL VIT/IRON FUM/FOLIC AC 27MG-0.8MG
1 TABLET ORAL
COMMUNITY
Start: 2021-12-20

## 2022-04-22 RX ORDER — CHLORAL HYDRATE 500 MG
CAPSULE ORAL
COMMUNITY
Start: 2021-12-13

## 2022-04-22 RX ADMIN — LEVONORGESTREL 20 MCG: 52 INTRAUTERINE DEVICE INTRAUTERINE at 12:30

## 2022-04-22 ASSESSMENT — PAIN SCALES - GENERAL: PAINLEVEL: MODERATE PAIN (5)

## 2022-04-22 NOTE — PROGRESS NOTES
"Oncology Rooming Note    April 22, 2022 11:02 AM   Simran Maldonado is a 36 year old female who presents for:    Chief Complaint   Patient presents with     Oncology Clinic Visit     Initial Vitals: /78 (BP Location: Right arm, Patient Position: Sitting, Cuff Size: Adult Large)   Pulse 71   Temp 98.4  F (36.9  C) (Oral)   Resp 20   Ht 1.51 m (4' 11.45\")   Wt 92.4 kg (203 lb 9.6 oz)   SpO2 99%   BMI 40.50 kg/m   Estimated body mass index is 40.5 kg/m  as calculated from the following:    Height as of this encounter: 1.51 m (4' 11.45\").    Weight as of this encounter: 92.4 kg (203 lb 9.6 oz). Body surface area is 1.97 meters squared.  Moderate Pain (5) Comment: Data Unavailable   No LMP recorded. (Menstrual status: Irregular Periods).  Allergies reviewed: Yes  Medications reviewed: Yes    Medications: Medication refills not needed today.  Pharmacy name entered into eCurv: [id number] - [place], [place] - [address] AT [address]    Clinical concerns: no       Patient with  Td Kaplan CMA              "

## 2022-04-22 NOTE — PROGRESS NOTES
Consult Notes on Referred Patient            RE: Simran Maldonado  : 1985  RASHIDA: 2022    *Monegasque video  used for interview and exam*    I had the pleasure of seeing your patient Simran Maldonado here at the Gynecologic Cancer Clinic at the [facility] on 2022.  As you know she is a very pleasant 36 year old woman with a recent diagnosis of EIN.  Given these findings she was subsequently sent to the Gynecologic Cancer Clinic for new patient consultation.  She is accompanied today by her hudband.    Patient has always had abnormal menses.  In 2020 she was diagnosed with EIN.  It was recommended that she begin progesterone therapy, however she desired to pursue pregnancy and thus never initiated any treatment for her EIN.  She has not become pregnant and recently sought care for infertility evaluation.  Since her EIN had gone untreated, she was taken for another D&C which again shows EIN. She was recommended to start a progesterone pill. Two weeks after her procedure she presented to [facility] ED with very heavy vaginal bleeding. She was prescribed Megace. She took Megace for one week and her bleeding did stop. She stopped this medication due to intolerable side effects.    Since stopping Megace she has had two episode of heavy bleeding, although she was able to manage the bleeding with pads and tampons. She denies any additional new symptoms including changes to her weight, changes in bowel or bladder habits.     Simran and her partner have always wanted to have more children. They have been attempting to conceive since the birth of their last child. They have not undergone fertility testing or used any form of ART. She has considered this but is concerned about the cost.      20:  US pelvis (personally reviewed):  Uterus:  8.35 x 5.37 x 4.89 cm   Position is anteverted.  Contour is smooth/regular. Endo cav: 12.43 mm Hyperechoic area within  endometrium (1.37 x 0.69 x 1.21cm) Right ovary: 2.88 x 2.5 x 2.1cm  Mult cysts on periphery Left ovary:   3.34 x 1.93 x 2.26 cm Mult cysts on peripheryCul de sac: no free fluid    20:  Hysteroscopy, D&C   ECC:  negative, Endometrial polyp:  EIN, Endometrial curetting:  Proliferative type endometrium    21:  US pelvis (personally reviewed):  Uterus:  8.91 x 5.3 x 5.4 cm Position is anteverted.  Contour is smooth/regular. Endo cav: 9.6 mm       Smooth/regular/wnl Right ovary: 4.0 x 2.9 x 2.4 cm  Wnl Left ovary:   3.8 x 3.0 x 1.7 cm Wnl Cul de sac: no free fluid    1/3/22:  US pelvis (personally reviewed): Uterus: measures 8.1 x 5.2 x 6.3 cm.  No focal myometrial lesion. Endometrial stripe:  Approximately 8 mm when accounting for hypoechoic cavitary fluid.  Right ovary:  4.3 x 1.9 x 2.9 cm.   Peripheral predominant subcentimeter follicles. Limited Doppler evaluation. Left ovary:  3.9 x 1.8 x 2.4 cm.   Peripheral predominant subcentimeter follicles. Limited Doppler evaluation. The urinary bladder is minimally distended and unremarkable where visualized.    2/15/22:  Hysteroscopy, D&C:  EIN   Comment: This is not a straightforward case because of the unusual secretory changes present in the hyperplastic glandular epithelium. However, there is enough glandular crowding, nuclear atypia, and apoptotic bodies to warrant the diagnosis of EIN    Obstetrics and Gynecology History:  , NSVDx1  Attempting pregnancy x 8 years     Past Medical History:  Past Medical History:   Diagnosis Date     Endometrial hyperplasia with atypia      Past Surgical History:  Past Surgical History:   Procedure Laterality Date     DILATION AND CURETTAGE, OPERATIVE HYSTEROSCOPY WITH MORCELLATOR, COMBINED N/A 2020    Procedure: OPERATIVE HYSTEROSCOPY WITH INTRAUTERINE MORCELLATOR, DILATION AND CURETTAGE;  Surgeon: Gopi Sy MD;  Location: [facility] OR     HYSTEROSCOPY,REMOVE MYOMA       Health Maintenance:  Last Pap  "Smear: 5/9/19              Result: Negative, HPV negative  She has not had a history of abnormal Pap smears.    Last Mammogram: N/A    Last Colonoscopy: N/A    Social History:  Lives with her  and 8 year old child, feels safe at home.  She had a large and supportive community. 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  Does not have an advanced directive on file and would like her  to be her POA.  Full code.     Family History:   The patient's family history is significant for.  Family History   Problem Relation Age of Onset     Cancer Father      Breast Cancer Sister        Physical Exam:   /78 (BP Location: Right arm, Patient Position: Sitting, Cuff Size: Adult Large)   Pulse 71   Temp 98.4  F (36.9  C) (Oral)   Resp 20   Ht 1.51 m (4' 11.45\")   Wt 92.4 kg (203 lb 9.6 oz)   SpO2 99%   BMI 40.50 kg/m    Body mass index is 40.5 kg/m .    General Appearance: Healthy and alert, no distress        Cardiovascular: Regular rate     Respiratory: Non labored breathing     Genitourinary: External genitalia and urethral meatus appears normal.  Vagina is smooth without nodularity or masses.  Cervix appears normal and without lesions.     Procedure: Written consent obtained. Sterile speculum placed. Cervix visualized. Anterior lip grasped with tenaculum. Cervix cleansed with betadine x3. Uterus sounded to 7.5 cm. Mirena IUD deployed at the fundus in standard fashion. Strings trimmed to 4 cm. All instruments removed from vagina. Patient tolerated procedure well.   Mirena Lot: [id number]  Exp: 2024/Aug    Labs:    UPT negative    Assessment:    Simran Maldonado is a 36 year old woman with a diagnosis of EIN.     A total of 60 minutes was spent on patient care today.       Plan:     1.)    EIN-We reviewed the natural history and progression of disease.  We also reviewed the risk of a concomitant malignancy in a hysterectomy specimen.  We discussed that this process would " likely interfere with her ability to become pregnant and thus should be treated prior to her pursuing pregnancy. We discussed standard surgery treatment, however given she wishes to preserve her fertility we discussed the option of progesterone therapy.  We discussed options of oral as well as Mirena progesterone therapy and she is in favor of Mirena which was placed today.  We discussed the need for repeat biopsy at 3 month intervals until there is resolution of her EIN and to ensure no progression to malignancy.  In the meantime, I have provided her with information on several fertility specialists as she is likely to need ART in order to conceive given she has not had success in over 8 years of attempting pregnancy.  She will return in 3 months for EMB.      2.) Genetic risk factors were assessed and the patient does not meet the qualifications for a referral.      3.) Labs and/or tests ordered include:  None.     4.) Health maintenance issues addressed today include pt is up to date.          Thank you for allowing us to participate in the care of your patient.         Sincerely,    Maricarmen Alonso MD  Gynecologic Oncology  [facility]       CC

## 2022-04-22 NOTE — LETTER
2022         RE: Simran Maldonado  [address]  [facility] [address]        Dear Colleague,    Thank you for referring your patient, Simran Maldonado, to the [facility]. Please see a copy of my visit note below.    Consult Notes on Referred Patient            RE: Simran Maldonado  : 1985  RASHIDA: 2022    *Azerbaijani video  used for interview and exam*    I had the pleasure of seeing your patient Simran Maldonado here at the Gynecologic Cancer Clinic at the [facility] on 2022.  As you know she is a very pleasant 36 year old woman with a recent diagnosis of EIN.  Given these findings she was subsequently sent to the Gynecologic Cancer Clinic for new patient consultation.  She is accompanied today by her hudband.    Patient has always had abnormal menses.  In 2020 she was diagnosed with EIN.  It was recommended that she begin progesterone therapy, however she desired to pursue pregnancy and thus never initiated any treatment for her EIN.  She has not become pregnant and recently sought care for infertility evaluation.  Since her EIN had gone untreated, she was taken for another D&C which again shows EIN. She was recommended to start a progesterone pill. Two weeks after her procedure she presented to [facility] ED with very heavy vaginal bleeding. She was prescribed Megace. She took Megace for one week and her bleeding did stop. She stopped this medication due to intolerable side effects.    Since stopping Megace she has had two episode of heavy bleeding, although she was able to manage the bleeding with pads and tampons. She denies any additional new symptoms including changes to her weight, changes in bowel or bladder habits.     Simran and her partner have always wanted to have more children. They have been attempting to conceive since the birth of their last child. They have not undergone fertility  testing or used any form of ART. She has considered this but is concerned about the cost.      20:  US pelvis (personally reviewed):  Uterus:  8.35 x 5.37 x 4.89 cm   Position is anteverted.  Contour is smooth/regular. Endo cav: 12.43 mm Hyperechoic area within endometrium (1.37 x 0.69 x 1.21cm) Right ovary: 2.88 x 2.5 x 2.1cm  Mult cysts on periphery Left ovary:   3.34 x 1.93 x 2.26 cm Mult cysts on peripheryCul de sac: no free fluid    20:  Hysteroscopy, D&C   ECC:  negative, Endometrial polyp:  EIN, Endometrial curetting:  Proliferative type endometrium    21:  US pelvis (personally reviewed):  Uterus:  8.91 x 5.3 x 5.4 cm Position is anteverted.  Contour is smooth/regular. Endo cav: 9.6 mm       Smooth/regular/wnl Right ovary: 4.0 x 2.9 x 2.4 cm  Wnl Left ovary:   3.8 x 3.0 x 1.7 cm Wnl Cul de sac: no free fluid    1/3/22:  US pelvis (personally reviewed): Uterus: measures 8.1 x 5.2 x 6.3 cm.  No focal myometrial lesion. Endometrial stripe:  Approximately 8 mm when accounting for hypoechoic cavitary fluid.  Right ovary:  4.3 x 1.9 x 2.9 cm.   Peripheral predominant subcentimeter follicles. Limited Doppler evaluation. Left ovary:  3.9 x 1.8 x 2.4 cm.   Peripheral predominant subcentimeter follicles. Limited Doppler evaluation. The urinary bladder is minimally distended and unremarkable where visualized.    2/15/22:  Hysteroscopy, D&C:  EIN   Comment: This is not a straightforward case because of the unusual secretory changes present in the hyperplastic glandular epithelium. However, there is enough glandular crowding, nuclear atypia, and apoptotic bodies to warrant the diagnosis of EIN    Obstetrics and Gynecology History:  , NSVDx1  Attempting pregnancy x 8 years     Past Medical History:  Past Medical History:   Diagnosis Date     Endometrial hyperplasia with atypia      Past Surgical History:  Past Surgical History:   Procedure Laterality Date     DILATION AND CURETTAGE, OPERATIVE  "HYSTEROSCOPY WITH MORCELLATOR, COMBINED N/A 12/08/2020    Procedure: OPERATIVE HYSTEROSCOPY WITH INTRAUTERINE MORCELLATOR, DILATION AND CURETTAGE;  Surgeon: Gopi Sy MD;  Location: [facility] OR     HYSTEROSCOPY,REMOVE MYOMA  2011     Health Maintenance:  Last Pap Smear: 5/9/19              Result: Negative, HPV negative  She has not had a history of abnormal Pap smears.    Last Mammogram: N/A    Last Colonoscopy: N/A    Social History:  Lives with her  and 8 year old child, feels safe at home.  She had a large and supportive community. 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  Does not have an advanced directive on file and would like her  to be her POA.  Full code.     Family History:   The patient's family history is significant for.  Family History   Problem Relation Age of Onset     Cancer Father      Breast Cancer Sister        Physical Exam:   /78 (BP Location: Right arm, Patient Position: Sitting, Cuff Size: Adult Large)   Pulse 71   Temp 98.4  F (36.9  C) (Oral)   Resp 20   Ht 1.51 m (4' 11.45\")   Wt 92.4 kg (203 lb 9.6 oz)   SpO2 99%   BMI 40.50 kg/m    Body mass index is 40.5 kg/m .    General Appearance: Healthy and alert, no distress        Cardiovascular: Regular rate     Respiratory: Non labored breathing     Genitourinary: External genitalia and urethral meatus appears normal.  Vagina is smooth without nodularity or masses.  Cervix appears normal and without lesions.     Procedure: Written consent obtained. Sterile speculum placed. Cervix visualized. Anterior lip grasped with tenaculum. Cervix cleansed with betadine x3. Uterus sounded to 7.5 cm. Mirena IUD deployed at the fundus in standard fashion. Strings trimmed to 4 cm. All instruments removed from vagina. Patient tolerated procedure well.   Mirena Lot: [id number]  Exp: 2024/Aug    Labs:    UPT negative    Assessment:    Simran Maldonado is a 36 year old woman with a diagnosis of EIN.     A " "total of 60 minutes was spent on patient care today.       Plan:     1.)    EIN-We reviewed the natural history and progression of disease.  We also reviewed the risk of a concomitant malignancy in a hysterectomy specimen.  We discussed that this process would likely interfere with her ability to become pregnant and thus should be treated prior to her pursuing pregnancy. We discussed standard surgery treatment, however given she wishes to preserve her fertility we discussed the option of progesterone therapy.  We discussed options of oral as well as Mirena progesterone therapy and she is in favor of Mirena which was placed today.  We discussed the need for repeat biopsy at 3 month intervals until there is resolution of her EIN and to ensure no progression to malignancy.  In the meantime, I have provided her with information on several fertility specialists as she is likely to need ART in order to conceive given she has not had success in over 8 years of attempting pregnancy.  She will return in 3 months for EMB.      2.) Genetic risk factors were assessed and the patient does not meet the qualifications for a referral.      3.) Labs and/or tests ordered include:  None.     4.) Health maintenance issues addressed today include pt is up to date.          Thank you for allowing us to participate in the care of your patient.         Sincerely,    Maricarmen Alonso MD  Gynecologic Oncology  [facility]       CC        Oncology Rooming Note    April 22, 2022 11:02 AM   Simran Maldonado is a 36 year old female who presents for:    Chief Complaint   Patient presents with     Oncology Clinic Visit     Initial Vitals: /78 (BP Location: Right arm, Patient Position: Sitting, Cuff Size: Adult Large)   Pulse 71   Temp 98.4  F (36.9  C) (Oral)   Resp 20   Ht 1.51 m (4' 11.45\")   Wt 92.4 kg (203 lb 9.6 oz)   SpO2 99%   BMI 40.50 kg/m   Estimated body mass index is 40.5 kg/m  as calculated " "from the following:    Height as of this encounter: 1.51 m (4' 11.45\").    Weight as of this encounter: 92.4 kg (203 lb 9.6 oz). Body surface area is 1.97 meters squared.  Moderate Pain (5) Comment: Data Unavailable   No LMP recorded. (Menstrual status: Irregular Periods).  Allergies reviewed: Yes  Medications reviewed: Yes    Medications: Medication refills not needed today.  Pharmacy name entered into [employer]: [id number] [address] AT [address]    Clinical concerns: no       Patient with  [facility]                  Again, thank you for allowing me to participate in the care of your patient.        Sincerely,        Nolan Alonso MD    "

## 2022-04-26 NOTE — PROGRESS NOTES
[place] [facility] [place] 5th floor path lab

## 2022-04-27 ENCOUNTER — LAB (OUTPATIENT)
Dept: LAB | Facility: CLINIC | Age: 37
End: 2022-04-27
Payer: COMMERCIAL

## 2022-04-27 DIAGNOSIS — N85.02 ENDOMETRIAL HYPERPLASIA WITH ATYPIA: Primary | ICD-10-CM

## 2022-05-17 ENCOUNTER — TELEPHONE (OUTPATIENT)
Dept: ONCOLOGY | Facility: CLINIC | Age: 37
End: 2022-05-17
Payer: COMMERCIAL

## 2022-05-17 NOTE — TELEPHONE ENCOUNTER
"Pt calling in to report symptoms of vaginal bleeding. She has had trouble with bleeding issues in the past.    Pt said that she had an apt with with Dr. Alonso on 4/22. She did not have any bleeding the week after that apt, but has been struggling with bleeding issues since 4/29.    The blood is red/brown in color in the pads which she is changing 3-4 per day. Pt states she is using the thinnest pads possible, just for protection. She states that the amount of blood is classified as \"spotting\". Pt is not necessarily worried about the amount of blood, but pt states that she is physically very tired. Pt states that she feels like \"I am having my period all the time.\"     She is feeling very fatigued and is having some lower abdominal pain and radiates to her lower back. The pain is rated 5/10, and she has been taking tylenol and ibuprofen for pain. No fevers, or other signs of infection. Pt denies any urinary or bowel complications.    Will route to provider for recommendations.    Page to  at 1168  "

## 2022-05-17 NOTE — TELEPHONE ENCOUNTER
Writer called pt after coordination with RNCC. Apt offered to see Onelia ISAAC at [facility] tomorrow at 2pm.    Pt in agreement as she would like to be seen.    Pt will attend apt tomorrow with Onelia ISAAC at [facility].

## 2022-05-18 ENCOUNTER — ONCOLOGY VISIT (OUTPATIENT)
Dept: ONCOLOGY | Facility: CLINIC | Age: 37
End: 2022-05-18
Attending: PHYSICIAN ASSISTANT
Payer: COMMERCIAL

## 2022-05-18 VITALS
TEMPERATURE: 98.9 F | OXYGEN SATURATION: 97 % | BODY MASS INDEX: 40.64 KG/M2 | RESPIRATION RATE: 18 BRPM | HEART RATE: 76 BPM | SYSTOLIC BLOOD PRESSURE: 108 MMHG | WEIGHT: 204.3 LBS | DIASTOLIC BLOOD PRESSURE: 77 MMHG

## 2022-05-18 DIAGNOSIS — N93.9 VAGINAL SPOTTING: Primary | ICD-10-CM

## 2022-05-18 DIAGNOSIS — N85.02 EIN (ENDOMETRIAL INTRAEPITHELIAL NEOPLASIA): ICD-10-CM

## 2022-05-18 LAB
BASOPHILS # BLD AUTO: 0 10E3/UL (ref 0–0.2)
BASOPHILS NFR BLD AUTO: 1 %
EOSINOPHIL # BLD AUTO: 0.2 10E3/UL (ref 0–0.7)
EOSINOPHIL NFR BLD AUTO: 3 %
ERYTHROCYTE [DISTWIDTH] IN BLOOD BY AUTOMATED COUNT: 13.1 % (ref 10–15)
HCT VFR BLD AUTO: 39.8 % (ref 35–47)
HGB BLD-MCNC: 12.5 G/DL (ref 11.7–15.7)
IMM GRANULOCYTES # BLD: 0 10E3/UL
IMM GRANULOCYTES NFR BLD: 0 %
LYMPHOCYTES # BLD AUTO: 2.3 10E3/UL (ref 0.8–5.3)
LYMPHOCYTES NFR BLD AUTO: 36 %
MCH RBC QN AUTO: 24.6 PG (ref 26.5–33)
MCHC RBC AUTO-ENTMCNC: 31.4 G/DL (ref 31.5–36.5)
MCV RBC AUTO: 78 FL (ref 78–100)
MONOCYTES # BLD AUTO: 0.6 10E3/UL (ref 0–1.3)
MONOCYTES NFR BLD AUTO: 9 %
NEUTROPHILS # BLD AUTO: 3.3 10E3/UL (ref 1.6–8.3)
NEUTROPHILS NFR BLD AUTO: 51 %
NRBC # BLD AUTO: 0 10E3/UL
NRBC BLD AUTO-RTO: 0 /100
PLATELET # BLD AUTO: 319 10E3/UL (ref 150–450)
RBC # BLD AUTO: 5.08 10E6/UL (ref 3.8–5.2)
WBC # BLD AUTO: 6.4 10E3/UL (ref 4–11)

## 2022-05-18 PROCEDURE — 36415 COLL VENOUS BLD VENIPUNCTURE: CPT | Performed by: OBSTETRICS & GYNECOLOGY

## 2022-05-18 PROCEDURE — 85025 COMPLETE CBC W/AUTO DIFF WBC: CPT | Performed by: OBSTETRICS & GYNECOLOGY

## 2022-05-18 PROCEDURE — G0463 HOSPITAL OUTPT CLINIC VISIT: HCPCS

## 2022-05-18 PROCEDURE — 99213 OFFICE O/P EST LOW 20 MIN: CPT | Performed by: OBSTETRICS & GYNECOLOGY

## 2022-05-18 ASSESSMENT — PAIN SCALES - GENERAL: PAINLEVEL: NO PAIN (0)

## 2022-05-18 NOTE — NURSING NOTE
Venipuncture blood draw done on patients Left ac. Patient tolerated well without any complications. 21G needle used. Specimen sent to first floor lab. See flowsheets      Priyanka Corea, CMA

## 2022-05-18 NOTE — NURSING NOTE
"Oncology Rooming Note    May 18, 2022 1:52 PM   Simran Maldonado is a 36 year old female who presents for:    Chief Complaint   Patient presents with     Oncology Clinic Visit     Vaginal spotting (Primary Dx); EIN (endometrial intraepithelial neoplasia)      Initial Vitals: /77   Pulse 76   Temp 98.9  F (37.2  C) (Oral)   Resp 18   Wt 92.7 kg (204 lb 4.8 oz)   SpO2 97%   BMI 40.64 kg/m   Estimated body mass index is 40.64 kg/m  as calculated from the following:    Height as of 4/22/22: 1.51 m (4' 11.45\").    Weight as of this encounter: 92.7 kg (204 lb 4.8 oz). Body surface area is 1.97 meters squared.  No Pain (0) Comment: Data Unavailable   No LMP recorded. (Menstrual status: Irregular Periods).  Allergies reviewed: Yes  Medications reviewed: Yes    Medications: Medication refills not needed today.  Pharmacy name entered into [employer]: [id number] - [facility] [address] AT [facility]    Clinical concerns: NO new concerns per pt.       Priyanka Corea CMA            "

## 2022-05-18 NOTE — PROGRESS NOTES
Follow Up Notes on Referred Patient    Date: 2022      RE: Simran Maldonado  : 1985  RASHIDA: 2022        Simran Maldonado is a 36 year old woman with a diagnosis of EIN. Simran is s/p hysteroscopy D&D on 2/15/2022 with EIN pathology. She recently underwent Mirena IUD placement with Dr. Alonso on 2022. She She is here today for an acute visit.      Patient has always had abnormal menses.  In 2020 she was diagnosed with EIN.  It was recommended that she begin progesterone therapy, however she desired to pursue pregnancy and thus never initiated any treatment for her EIN.  She has not become pregnant and recently sought care for infertility evaluation.  Since her EIN had gone untreated, she was taken for another D&C which again shows EIN. She was recommended to start a progesterone pill. Two weeks after her procedure she presented to [facility] with very heavy vaginal bleeding. She was prescribed Megace. She took Megace for one week and her bleeding did stop. She stopped this medication due to intolerable side effects.     Since stopping Megace she has had two episode of heavy bleeding, although she was able to manage the bleeding with pads and tampons. She denies any additional new symptoms including changes to her weight, changes in bowel or bladder habits.      Simran and her partner have always wanted to have more children. They have been attempting to conceive since the birth of their last child. They have not undergone fertility testing or used any form of ART. She has considered this but is concerned about the cost.       20:  US pelvis:  Uterus:  8.35 x 5.37 x 4.89 cm   Position is anteverted.  Contour is smooth/regular. Endo cav: 12.43 mm Hyperechoic area within endometrium (1.37 x 0.69 x 1.21cm) Right ovary: 2.88 x 2.5 x 2.1cm  Mult cysts on periphery Left ovary:   3.34 x 1.93 x 2.26 cm Mult cysts on peripheryCul de sac: no free fluid     20:   Hysteroscopy, D&C                 ECC:  negative, Endometrial polyp:  EIN, Endometrial curetting:  Proliferative type endometrium     21:  US pelvis:  Uterus:  8.91 x 5.3 x 5.4 cm Position is anteverted.  Contour is smooth/regular. Endo cav: 9.6 mm       Smooth/regular/wnl Right ovary: 4.0 x 2.9 x 2.4 cm  Wnl Left ovary:   3.8 x 3.0 x 1.7 cm Wnl Cul de sac: no free fluid     1/3/22:  US pelvis: Uterus: measures 8.1 x 5.2 x 6.3 cm.  No focal myometrial lesion. Endometrial stripe:  Approximately 8 mm when accounting for hypoechoic cavitary fluid.  Right ovary:  4.3 x 1.9 x 2.9 cm.   Peripheral predominant subcentimeter follicles. Limited Doppler evaluation. Left ovary:  3.9 x 1.8 x 2.4 cm.   Peripheral predominant subcentimeter follicles. Limited Doppler evaluation. The urinary bladder is minimally distended and unremarkable where visualized.     2/15/22:  Hysteroscopy, D&C:  EIN                 Comment: This is not a straightforward case because of the unusual secretory changes present in the hyperplastic glandular epithelium. However, there is enough glandular crowding, nuclear atypia, and apoptotic bodies to warrant the diagnosis of EIN    22: Mirena IUD placed         Obstetrics and Gynecology History:  , NSVDx1  Attempting pregnancy x 8 years                Today Simran comes to the clinic for follow up and vaginal bleeding post IUD placement. She reports that this is her first IUD. She was unaware that vaginal spotting/brown discharge can be normal for many months after placement. Since Mirena placement on 22, pt reports that she did not have bleeding after the IUD placement for one week. Starting one week after the IUD was placed, she started to have normal period (improved lighter bleeding than menstrual cycles prior to IUD placement).    After her menstrual cycle, she started to have a brown vaginal discharge which is what she has now. She denies any vaginal bleeding currently other than the  discharge. She denies any heavy bleeding since IUD placement. She reports cramp pain during the 10 days of bleeding. Today she denies pain. She will take Advil prn for cramping which will control her discomfort. Using liners due to bleeding being so light. She has not needed pads. She was not on birth control prior to the IUD.     She denies changes in her bowel or bladder habits, no nausea/emesis, no lower extremity edema, and no difficulties eating or sleeping. She denies any abdominal discomfort/bloating, no fevers or chills, and no chest pain or shortness of breath.                Review of Systems:    Systemic           no weight changes; no fever; no chills; no night sweats; no appetite changes  Skin           no rashes, or lesions  Eye           no irritation; no changes in vision  Robson-Laryngeal           no dysphagia; no hoarseness   Pulmonary    no cough; no shortness of breath  Cardiovascular    no chest pain; no palpitations  Gastrointestinal    no diarrhea; no constipation; no abdominal pain; no changes in bowel  habits; no blood in stool  Genitourinary   no urinary frequency; no urinary urgency; no dysuria; no pain; no abnormal vaginal discharge; no abnormal vaginal bleeding  Breast    no breast discharge; no breast changes; no breast pain  Musculoskeletal    no myalgias; no arthralgias; no back pain  Psychiatric           no depressed mood; no anxiety    Hematologic           no tender lymph nodes; no noticeable swellings or lumps   Endocrine    no hot flashes; no heat/cold intolerance         Neurological   no tremor; no numbness and tingling; no headaches; no difficulty  sleeping      Past Medical History:    Past Medical History:   Diagnosis Date     Endometrial hyperplasia with atypia          Past Surgical History:    Past Surgical History:   Procedure Laterality Date     DILATION AND CURETTAGE, OPERATIVE HYSTEROSCOPY WITH MORCELLATOR, COMBINED N/A 12/08/2020    Procedure: OPERATIVE HYSTEROSCOPY  WITH INTRAUTERINE MORCELLATOR, DILATION AND CURETTAGE;  Surgeon: Gopi Sy MD;  Location: [facility] OR     HYSTEROSCOPY,REMOVE MYOMA  2011         Health Maintenance Due   Topic Date Due     ADVANCE CARE PLANNING  Never done     PREVENTIVE CARE VISIT  10/30/2021     PHQ-2 (once per calendar year)  01/01/2022     COVID-19 Vaccine (3 - Booster for Pfizer series) 04/08/2022       Current Medications:     Current Outpatient Medications   Medication Sig Dispense Refill     fish oil-omega-3 fatty acids 1000 MG capsule TAKE 2 CAPSULES BY MOUTH DAILY       VITAMIN D3 25 MCG (1000 UT) tablet Take 1 tablet by mouth daily       acetaminophen (TYLENOL) 325 MG tablet Take 325-650 mg by mouth (Patient not taking: Reported on 5/18/2022)       ibuprofen (ADVIL/MOTRIN) 600 MG tablet Take 600 mg by mouth (Patient not taking: Reported on 5/18/2022)       Prenatal Vit-Fe Fumarate-FA (PRENATAL MULTIVITAMIN W/IRON) 27-0.8 MG tablet Take 1 tablet by mouth (Patient not taking: Reported on 5/18/2022)           Allergies:      No Known Allergies     Social History:     Social History     Tobacco Use     Smoking status: Never Smoker     Smokeless tobacco: Never Used   Substance Use Topics     Alcohol use: No       History   Drug Use No         Family History:     The patient's family history is notable for     Family History   Problem Relation Age of Onset     Cancer Father      Breast Cancer Sister          Physical Exam:     /77   Pulse 76   Temp 98.9  F (37.2  C) (Oral)   Resp 18   Wt 92.7 kg (204 lb 4.8 oz)   SpO2 97%   BMI 40.64 kg/m    Body mass index is 40.64 kg/m .    General Appearance: healthy and alert, no distress     HEENT: no thyromegaly, no palpable nodules or masses        Cardiovascular: regular rate and rhythm, no gallops, rubs or murmurs     Respiratory: lungs clear, no rales, rhonchi or wheezes    Musculoskeletal: extremities non tender and without edema    Skin: no lesions or rashes     Neurological: normal  gait, no gross defects     Psychiatric: appropriate mood and affect                               Hematological: normal cervical, supraclavicular and inguinal lymph nodes     Gastrointestinal:       abdomen soft, non-tender, non-distended, no organomegaly or masses    Genitourinary: External genitalia and urethral meatus appears normal.  Vagina is smooth without nodularity or masses.  Cervix appears normal and without lesions.  Mirena IUD strings present. Minimal spotting/clots in vagina. No active bleeding. Bimanual exam reveal no masses, nodularity or fullness, no cervical motion tenderness.         Assessment:      Simran Maldonado is a 36 year old woman with a diagnosis of EIN. Simran is s/p hysteroscopy D&D on 2/15/2022 with EIN pathology. She recently underwent Mirena IUD placement with Dr. Alonso on 4/22/2022. She She is here today for an acute visit.      25 minutes spent on the date of the encounter doing chart review, history and exam, documentation, and further activities as noted above.          Plan:     1.)       EIN: Mirena IUD placed 4/22/22 with the plan for repeat biopsy at 3 month intervals until there is resolution of her EIN and to ensure no progression to malignancy. Pt was previously provided with fertility specialists. Pt will return to see Dr. Alonso in 2 months for EMB. (not scheduled as of today- requested follow up with MD today).     -Mirena IUD: in place with strings visible. No cervical motion tenderness.   -Vaginal spotting: minimal and normal post IUD placement. With  services, I explained to the patient that it can be normal for the first 3-6 months after insertion to have spotting/brown discharge. Pt is aware that any heavy vaginal bleeding (soaking through one or more pads per hour, she would need to go to a local ER). Continue NSAIDs prn for menstrual discomfort as she has been.   -Fatigue: CBC wnl. Discussed common causes of fatigue with patient today and  discussed healthy diet, activity, sleep patterns. She will follow up with her PCP for further management and labs.    2.) Genetic risk factors were assessed and the patient does not meet the qualifications for a referral.      3.) Labs and/or tests ordered include: CBC w diff reviewed with pt today.     4.) Health maintenance issues addressed today include annual health maintenance and non-gynecologic issues with PCP.          GRAHAM Beatty, NP-BC  Women's Health Nurse Practitioner  Division of Gynecologic Oncology  [facility]        CC  Patient Care Team:  Giovanna Florian PA-C as PCP - General (Physician Assistant)  Gopi Sy MD as Assigned OBGYN Provider  Trevon Royal MD as Assigned PCP  Maricarmen Vásquez MD as MD (Gynecologic Oncology)

## 2022-05-18 NOTE — LETTER
2022         RE: Simran Maldonado  [address]  [facility] [address]        Dear Colleague,    Thank you for referring your patient, Simran Maldonado, to the [facility]. Please see a copy of my visit note below.                   Follow Up Notes on Referred Patient    Date: 2022      RE: Simran Maldonado  : 1985  RASHIDA: 2022        Simran Maldonado is a 36 year old woman with a diagnosis of EIN. Simran is s/p hysteroscopy D&D on 2/15/2022 with EIN pathology. She recently underwent Mirena IUD placement with Dr. Alonso on 2022. She She is here today for an acute visit.      Patient has always had abnormal menses.  In 2020 she was diagnosed with EIN.  It was recommended that she begin progesterone therapy, however she desired to pursue pregnancy and thus never initiated any treatment for her EIN.  She has not become pregnant and recently sought care for infertility evaluation.  Since her EIN had gone untreated, she was taken for another D&C which again shows EIN. She was recommended to start a progesterone pill. Two weeks after her procedure she presented to [facility] ED with very heavy vaginal bleeding. She was prescribed Megace. She took Megace for one week and her bleeding did stop. She stopped this medication due to intolerable side effects.     Since stopping Megace she has had two episode of heavy bleeding, although she was able to manage the bleeding with pads and tampons. She denies any additional new symptoms including changes to her weight, changes in bowel or bladder habits.      Simran and her partner have always wanted to have more children. They have been attempting to conceive since the birth of their last child. They have not undergone fertility testing or used any form of ART. She has considered this but is concerned about the cost.       20:  US pelvis:  Uterus:  8.35 x 5.37 x 4.89 cm   Position is  anteverted.  Contour is smooth/regular. Endo cav: 12.43 mm Hyperechoic area within endometrium (1.37 x 0.69 x 1.21cm) Right ovary: 2.88 x 2.5 x 2.1cm  Mult cysts on periphery Left ovary:   3.34 x 1.93 x 2.26 cm Mult cysts on peripheryCul de sac: no free fluid     20:  Hysteroscopy, D&C                 ECC:  negative, Endometrial polyp:  EIN, Endometrial curetting:  Proliferative type endometrium     21:  US pelvis:  Uterus:  8.91 x 5.3 x 5.4 cm Position is anteverted.  Contour is smooth/regular. Endo cav: 9.6 mm       Smooth/regular/wnl Right ovary: 4.0 x 2.9 x 2.4 cm  Wnl Left ovary:   3.8 x 3.0 x 1.7 cm Wnl Cul de sac: no free fluid     1/3/22:  US pelvis: Uterus: measures 8.1 x 5.2 x 6.3 cm.  No focal myometrial lesion. Endometrial stripe:  Approximately 8 mm when accounting for hypoechoic cavitary fluid.  Right ovary:  4.3 x 1.9 x 2.9 cm.   Peripheral predominant subcentimeter follicles. Limited Doppler evaluation. Left ovary:  3.9 x 1.8 x 2.4 cm.   Peripheral predominant subcentimeter follicles. Limited Doppler evaluation. The urinary bladder is minimally distended and unremarkable where visualized.     2/15/22:  Hysteroscopy, D&C:  EIN                 Comment: This is not a straightforward case because of the unusual secretory changes present in the hyperplastic glandular epithelium. However, there is enough glandular crowding, nuclear atypia, and apoptotic bodies to warrant the diagnosis of EIN    22: Mirena IUD placed         Obstetrics and Gynecology History:  , NSVDx1  Attempting pregnancy x 8 years                Today Simran comes to the clinic for follow up and vaginal bleeding post IUD placement. She reports that this is her first IUD. She was unaware that vaginal spotting/brown discharge can be normal for many months after placement. Since Mirena placement on 22, pt reports that she did not have bleeding after the IUD placement for one week. Starting one week after the IUD was  placed, she started to have normal period (improved lighter bleeding than menstrual cycles prior to IUD placement).    After her menstrual cycle, she started to have a brown vaginal discharge which is what she has now. She denies any vaginal bleeding currently other than the discharge. She denies any heavy bleeding since IUD placement. She reports cramp pain during the 10 days of bleeding. Today she denies pain. She will take Advil prn for cramping which will control her discomfort. Using liners due to bleeding being so light. She has not needed pads. She was not on birth control prior to the IUD.     She denies changes in her bowel or bladder habits, no nausea/emesis, no lower extremity edema, and no difficulties eating or sleeping. She denies any abdominal discomfort/bloating, no fevers or chills, and no chest pain or shortness of breath.                Review of Systems:    Systemic           no weight changes; no fever; no chills; no night sweats; no appetite changes  Skin           no rashes, or lesions  Eye           no irritation; no changes in vision  Robson-Laryngeal           no dysphagia; no hoarseness   Pulmonary    no cough; no shortness of breath  Cardiovascular    no chest pain; no palpitations  Gastrointestinal    no diarrhea; no constipation; no abdominal pain; no changes in bowel  habits; no blood in stool  Genitourinary   no urinary frequency; no urinary urgency; no dysuria; no pain; no abnormal vaginal discharge; no abnormal vaginal bleeding  Breast    no breast discharge; no breast changes; no breast pain  Musculoskeletal    no myalgias; no arthralgias; no back pain  Psychiatric           no depressed mood; no anxiety    Hematologic           no tender lymph nodes; no noticeable swellings or lumps   Endocrine    no hot flashes; no heat/cold intolerance         Neurological   no tremor; no numbness and tingling; no headaches; no difficulty  sleeping      Past Medical History:    Past Medical  History:   Diagnosis Date     Endometrial hyperplasia with atypia          Past Surgical History:    Past Surgical History:   Procedure Laterality Date     DILATION AND CURETTAGE, OPERATIVE HYSTEROSCOPY WITH MORCELLATOR, COMBINED N/A 12/08/2020    Procedure: OPERATIVE HYSTEROSCOPY WITH INTRAUTERINE MORCELLATOR, DILATION AND CURETTAGE;  Surgeon: Gopi Sy MD;  Location: [facility] OR     HYSTEROSCOPY,REMOVE MYOMA  2011         Health Maintenance Due   Topic Date Due     ADVANCE CARE PLANNING  Never done     PREVENTIVE CARE VISIT  10/30/2021     PHQ-2 (once per calendar year)  01/01/2022     COVID-19 Vaccine (3 - Booster for Pfizer series) 04/08/2022       Current Medications:     Current Outpatient Medications   Medication Sig Dispense Refill     fish oil-omega-3 fatty acids 1000 MG capsule TAKE 2 CAPSULES BY MOUTH DAILY       VITAMIN D3 25 MCG (1000 UT) tablet Take 1 tablet by mouth daily       acetaminophen (TYLENOL) 325 MG tablet Take 325-650 mg by mouth (Patient not taking: Reported on 5/18/2022)       ibuprofen (ADVIL/MOTRIN) 600 MG tablet Take 600 mg by mouth (Patient not taking: Reported on 5/18/2022)       Prenatal Vit-Fe Fumarate-FA (PRENATAL MULTIVITAMIN W/IRON) 27-0.8 MG tablet Take 1 tablet by mouth (Patient not taking: Reported on 5/18/2022)           Allergies:      No Known Allergies     Social History:     Social History     Tobacco Use     Smoking status: Never Smoker     Smokeless tobacco: Never Used   Substance Use Topics     Alcohol use: No       History   Drug Use No         Family History:     The patient's family history is notable for     Family History   Problem Relation Age of Onset     Cancer Father      Breast Cancer Sister          Physical Exam:     /77   Pulse 76   Temp 98.9  F (37.2  C) (Oral)   Resp 18   Wt 92.7 kg (204 lb 4.8 oz)   SpO2 97%   BMI 40.64 kg/m    Body mass index is 40.64 kg/m .    General Appearance: healthy and alert, no distress     HEENT: no thyromegaly,  no palpable nodules or masses        Cardiovascular: regular rate and rhythm, no gallops, rubs or murmurs     Respiratory: lungs clear, no rales, rhonchi or wheezes    Musculoskeletal: extremities non tender and without edema    Skin: no lesions or rashes     Neurological: normal gait, no gross defects     Psychiatric: appropriate mood and affect                               Hematological: normal cervical, supraclavicular and inguinal lymph nodes     Gastrointestinal:       abdomen soft, non-tender, non-distended, no organomegaly or masses    Genitourinary: External genitalia and urethral meatus appears normal.  Vagina is smooth without nodularity or masses.  Cervix appears normal and without lesions.  Mirena IUD strings present. Minimal spotting/clots in vagina. No active bleeding. Bimanual exam reveal no masses, nodularity or fullness, no cervical motion tenderness.         Assessment:      Simran Maldonado is a 36 year old woman with a diagnosis of EIN. Simran is s/p hysteroscopy D&D on 2/15/2022 with EIN pathology. She recently underwent Mirena IUD placement with Dr. Alonso on 4/22/2022. She She is here today for an acute visit.      25 minutes spent on the date of the encounter doing chart review, history and exam, documentation, and further activities as noted above.          Plan:     1.)       EIN: Mirena IUD placed 4/22/22 with the plan for repeat biopsy at 3 month intervals until there is resolution of her EIN and to ensure no progression to malignancy. Pt was previously provided with fertility specialists. Pt will return to see Dr. Alonso in 2 months for EMB. (not scheduled as of today- requested follow up with MD today).     -Mirena IUD: in place with strings visible. No cervical motion tenderness.   -Vaginal spotting: minimal and normal post IUD placement. With  services, I explained to the patient that it can be normal for the first 3-6 months after insertion to have spotting/brown  discharge. Pt is aware that any heavy vaginal bleeding (soaking through one or more pads per hour, she would need to go to a local ER). Continue NSAIDs prn for menstrual discomfort as she has been.   -Fatigue: CBC wnl. Discussed common causes of fatigue with patient today and discussed healthy diet, activity, sleep patterns. She will follow up with her PCP for further management and labs.    2.) Genetic risk factors were assessed and the patient does not meet the qualifications for a referral.      3.) Labs and/or tests ordered include: CBC w diff reviewed with pt today.     4.) Health maintenance issues addressed today include annual health maintenance and non-gynecologic issues with PCP.          GRAHAM Beatty, NP-BC  Women's Health Nurse Practitioner  Division of Gynecologic Oncology  [facility]        CC  Patient Care Team:  Giovanna Florian PA-C as PCP - General (Physician Assistant)  Gopi Sy MD as Assigned OBGYN Provider  Trevon Royal MD as Assigned PCP  Maricarmen Vásquez MD as MD (Gynecologic Oncology)

## 2022-07-26 NOTE — PROGRESS NOTES
"Consult Notes on Referred Patient            RE: Simran Maldonado  : 1985  RASHIDA: 2022    *Emirati video  used for interview and exam*    HPI:  Simran Maldonado is 36 year old with EIN being managed medically with IUD in place since 22, for 3 months now. She is accompanied today by her hudband.  Since placement of Mirena she reports has had continuous menstrual bleeding. At first it was similar to her menstrual cycle, but then it turned to spotting of \"brown coffee\" discharge. Denies pain. This has been very bothersome for her to wear a pad daily. She also has noticed some skin and hair changes.    She also has been considering her fertility over the past few months and speaking with her partner. She did not see a fertility specialist. She has come to the decision she is satisfied with her family size and would like to proceed with surgical management for her EIN.    Clinical Course:  Patient has always had abnormal menses.  In 2020 she was diagnosed with EIN.  It was recommended that she begin progesterone therapy, however she desired to pursue pregnancy and thus never initiated any treatment for her EIN.  She has not become pregnant and recently sought care for infertility evaluation.  Since her EIN had gone untreated, she was taken for another D&C which again shows EIN. She was recommended to start a progesterone pill. Two weeks after her procedure she presented to [facility] ED with very heavy vaginal bleeding. She was prescribed Megace. She took Megace for one week and her bleeding did stop. She stopped this medication due to intolerable side effects. Since stopping Megace she has had two episode of heavy bleeding, although she was able to manage the bleeding with pads and tampons. She denies any additional new symptoms including changes to her weight, changes in bowel or bladder habits. Simran and her partner have always wanted to have more children. They have " been attempting to conceive since the birth of their last child. They have not undergone fertility testing or used any form of ART. She has considered this but is concerned about the cost.       20:  US pelvis:  Uterus:  8.35 x 5.37 x 4.89 cm   Position is anteverted.  Contour is smooth/regular. Endo cav: 12.43 mm Hyperechoic area within endometrium (1.37 x 0.69 x 1.21cm) Right ovary: 2.88 x 2.5 x 2.1cm  Mult cysts on periphery Left ovary:   3.34 x 1.93 x 2.26 cm Mult cysts on peripheryCul de sac: no free fluid    20:  Hysteroscopy, D&C   ECC:  negative, Endometrial polyp:  EIN, Endometrial curetting:  Proliferative type endometrium    21:  US pelvis:  Uterus:  8.91 x 5.3 x 5.4 cm Position is anteverted.  Contour is smooth/regular. Endo cav: 9.6 mm       Smooth/regular/wnl Right ovary: 4.0 x 2.9 x 2.4 cm  Wnl Left ovary:   3.8 x 3.0 x 1.7 cm Wnl Cul de sac: no free fluid    1/3/22:  US pelvis: Uterus: measures 8.1 x 5.2 x 6.3 cm.  No focal myometrial lesion. Endometrial stripe:  Approximately 8 mm when accounting for hypoechoic cavitary fluid.  Right ovary:  4.3 x 1.9 x 2.9 cm.   Peripheral predominant subcentimeter follicles. Limited Doppler evaluation. Left ovary:  3.9 x 1.8 x 2.4 cm.   Peripheral predominant subcentimeter follicles. Limited Doppler evaluation. The urinary bladder is minimally distended and unremarkable where visualized.    2/15/22:  Hysteroscopy, D&C:  EIN   Comment: This is not a straightforward case because of the unusual secretory changes present in the hyperplastic glandular epithelium. However, there is enough glandular crowding, nuclear atypia, and apoptotic bodies to warrant the diagnosis of EIN    22:  Placement of Mirena IUD    Obstetrics and Gynecology History:  , NSVDx1  Attempting pregnancy x 8 years     Past Medical History:  Past Medical History:   Diagnosis Date     Endometrial hyperplasia with atypia      Past Surgical History:  Past Surgical History:    Procedure Laterality Date     DILATION AND CURETTAGE, OPERATIVE HYSTEROSCOPY WITH MORCELLATOR, COMBINED N/A 12/08/2020    Procedure: OPERATIVE HYSTEROSCOPY WITH INTRAUTERINE MORCELLATOR, DILATION AND CURETTAGE;  Surgeon: Gopi Sy MD;  Location: [facility] OR     HYSTEROSCOPY,REMOVE MYOMA  2011     LAPAROSCOPIC CHOLECYSTECTOMY       Health Maintenance:  Last Pap Smear: 5/9/19              Result: Negative, HPV negative  She has not had a history of abnormal Pap smears.    Last Mammogram: N/A    Last Colonoscopy: N/A    Social History:  Lives with her  and 8 year old child, feels safe at home.  She had a large and supportive community. 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  Does not have an advanced directive on file and would like her  to be her POA.  Full code.     Family History:   The patient's family history is significant for.  Family History   Problem Relation Age of Onset     Cancer Father      Breast Cancer Sister        Physical Exam:   /81 (BP Location: Left arm, Patient Position: Sitting, Cuff Size: Adult Large)   Pulse 63   Temp 98.1  F (36.7  C) (Oral)   Resp 20   Wt 90.3 kg (199 lb)   SpO2 99%   BMI 39.59 kg/m    Body mass index is 39.59 kg/m .    General Appearance: Healthy and alert, no distress        Cardiovascular: Regular rate     Respiratory: Non labored breathing     Genitourinary: External genitalia and urethral meatus appears normal.  Vagina is smooth without nodularity or masses.  Cervix appears normal and without lesions.     Procedure: After patient identification was confirmed and informed consent was reviewed, a bivalve speculum was placed in the vagina for adequate visualization of the cervix.  The cervix awas prepped with betadine.  The cervix was grasped with a single-tooth tenaculum to apply traction.  Endometrial biopsy was obtained with a single pass of the [place], with moderate tissue obtained.  The tenaculum was removed, and  the tenaculum site was hemostatic.  The speculum was then removed.  The patient tolerated the procedure well.  5 minutes was spent on the procedure separate from visit time.       Labs:  None    Assessment:    Simran Maldonado is a 36 year old woman with a diagnosis of EIN.     A total of 30 minutes was spent on patient care today.       Plan:     1.)    EIN-We reviewed the natural history and progression of disease.  Repeat biopsy done today and await results.  Given she no longer wishes to preserve fertility, we discussed my recommendation to proceed with hysterectomy.  She is in agreement but would like to wait for the biopsy results to determine timing.  If biopsy does not show cancer she would like to defer surgery until September as she is still recovering from her recent cholecystectomy.  If malignancy on biopsy she would like to proceed sooner.  I will call her with the biopsy results and to discuss surgery in more detail.     2.) Genetic risk factors were assessed and the patient does not meet the qualifications for a referral.      3.) Labs and/or tests ordered include:  EMB     4.) Health maintenance issues addressed today include pt is up to date.          Thank you for allowing us to participate in the care of your patient.         Sincerely,    Maricarmen Alonso MD  Gynecologic Oncology  [facility]       CC

## 2022-07-29 ENCOUNTER — ONCOLOGY VISIT (OUTPATIENT)
Dept: ONCOLOGY | Facility: CLINIC | Age: 37
End: 2022-07-29
Attending: OBSTETRICS & GYNECOLOGY
Payer: COMMERCIAL

## 2022-07-29 VITALS
HEART RATE: 63 BPM | WEIGHT: 199 LBS | SYSTOLIC BLOOD PRESSURE: 113 MMHG | DIASTOLIC BLOOD PRESSURE: 81 MMHG | OXYGEN SATURATION: 99 % | RESPIRATION RATE: 20 BRPM | BODY MASS INDEX: 39.59 KG/M2 | TEMPERATURE: 98.1 F

## 2022-07-29 DIAGNOSIS — N93.9 VAGINAL SPOTTING: ICD-10-CM

## 2022-07-29 DIAGNOSIS — N85.02 EIN (ENDOMETRIAL INTRAEPITHELIAL NEOPLASIA): Primary | ICD-10-CM

## 2022-07-29 PROCEDURE — 58100 BIOPSY OF UTERUS LINING: CPT | Performed by: OBSTETRICS & GYNECOLOGY

## 2022-07-29 PROCEDURE — 99214 OFFICE O/P EST MOD 30 MIN: CPT | Mod: 25 | Performed by: OBSTETRICS & GYNECOLOGY

## 2022-07-29 PROCEDURE — G0463 HOSPITAL OUTPT CLINIC VISIT: HCPCS

## 2022-07-29 PROCEDURE — 58100 BIOPSY OF UTERUS LINING: CPT

## 2022-07-29 PROCEDURE — 88305 TISSUE EXAM BY PATHOLOGIST: CPT | Mod: TC | Performed by: OBSTETRICS & GYNECOLOGY

## 2022-07-29 PROCEDURE — 88305 TISSUE EXAM BY PATHOLOGIST: CPT | Mod: 26 | Performed by: PATHOLOGY

## 2022-07-29 ASSESSMENT — PAIN SCALES - GENERAL: PAINLEVEL: NO PAIN (0)

## 2022-07-29 NOTE — LETTER
"    2022         RE: Simran Maldonado  [address]  [facility] [address]        Dear Colleague,    Thank you for referring your patient, Simran Maldonado, to the [facility]. Please see a copy of my visit note below.    Consult Notes on Referred Patient            RE: Simran Maldonado  : 1985  RASHIDA: 2022    *Korean video  used for interview and exam*    HPI:  Simran Maldonado is 36 year old with EIN being managed medically with IUD in place since 22, for 3 months now. She is accompanied today by her hudband.  Since placement of Mirena she reports has had continuous menstrual bleeding. At first it was similar to her menstrual cycle, but then it turned to spotting of \"brown coffee\" discharge. Denies pain. This has been very bothersome for her to wear a pad daily. She also has noticed some skin and hair changes.    She also has been considering her fertility over the past few months and speaking with her partner. She did not see a fertility specialist. She has come to the decision she is satisfied with her family size and would like to proceed with surgical management for her EIN.    Clinical Course:  Patient has always had abnormal menses.  In 2020 she was diagnosed with EIN.  It was recommended that she begin progesterone therapy, however she desired to pursue pregnancy and thus never initiated any treatment for her EIN.  She has not become pregnant and recently sought care for infertility evaluation.  Since her EIN had gone untreated, she was taken for another D&C which again shows EIN. She was recommended to start a progesterone pill. Two weeks after her procedure she presented to [facility] ED with very heavy vaginal bleeding. She was prescribed Megace. She took Megace for one week and her bleeding did stop. She stopped this medication due to intolerable side effects. Since stopping Megace she has had two " episode of heavy bleeding, although she was able to manage the bleeding with pads and tampons. She denies any additional new symptoms including changes to her weight, changes in bowel or bladder habits. Simran and her partner have always wanted to have more children. They have been attempting to conceive since the birth of their last child. They have not undergone fertility testing or used any form of ART. She has considered this but is concerned about the cost.       11/6/20:  US pelvis:  Uterus:  8.35 x 5.37 x 4.89 cm   Position is anteverted.  Contour is smooth/regular. Endo cav: 12.43 mm Hyperechoic area within endometrium (1.37 x 0.69 x 1.21cm) Right ovary: 2.88 x 2.5 x 2.1cm  Mult cysts on periphery Left ovary:   3.34 x 1.93 x 2.26 cm Mult cysts on peripheryCul de sac: no free fluid    12/8/20:  Hysteroscopy, D&C   ECC:  negative, Endometrial polyp:  EIN, Endometrial curetting:  Proliferative type endometrium    4/13/21:  US pelvis:  Uterus:  8.91 x 5.3 x 5.4 cm Position is anteverted.  Contour is smooth/regular. Endo cav: 9.6 mm       Smooth/regular/wnl Right ovary: 4.0 x 2.9 x 2.4 cm  Wnl Left ovary:   3.8 x 3.0 x 1.7 cm Wnl Cul de sac: no free fluid    1/3/22:  US pelvis: Uterus: measures 8.1 x 5.2 x 6.3 cm.  No focal myometrial lesion. Endometrial stripe:  Approximately 8 mm when accounting for hypoechoic cavitary fluid.  Right ovary:  4.3 x 1.9 x 2.9 cm.   Peripheral predominant subcentimeter follicles. Limited Doppler evaluation. Left ovary:  3.9 x 1.8 x 2.4 cm.   Peripheral predominant subcentimeter follicles. Limited Doppler evaluation. The urinary bladder is minimally distended and unremarkable where visualized.    2/15/22:  Hysteroscopy, D&C:  EIN   Comment: This is not a straightforward case because of the unusual secretory changes present in the hyperplastic glandular epithelium. However, there is enough glandular crowding, nuclear atypia, and apoptotic bodies to warrant the diagnosis of  EIN    22:  Placement of Mirena IUD    Obstetrics and Gynecology History:  , NSVDx1  Attempting pregnancy x 8 years     Past Medical History:  Past Medical History:   Diagnosis Date     Endometrial hyperplasia with atypia      Past Surgical History:  Past Surgical History:   Procedure Laterality Date     DILATION AND CURETTAGE, OPERATIVE HYSTEROSCOPY WITH MORCELLATOR, COMBINED N/A 2020    Procedure: OPERATIVE HYSTEROSCOPY WITH INTRAUTERINE MORCELLATOR, DILATION AND CURETTAGE;  Surgeon: Gopi Sy MD;  Location: [facility] OR     HYSTEROSCOPY,REMOVE MYOMA       LAPAROSCOPIC CHOLECYSTECTOMY       Health Maintenance:  Last Pap Smear: 19              Result: Negative, HPV negative  She has not had a history of abnormal Pap smears.    Last Mammogram: N/A    Last Colonoscopy: N/A    Social History:  Lives with her  and 8 year old child, feels safe at home.  She had a large and supportive community. 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  Does not have an advanced directive on file and would like her  to be her POA.  Full code.     Family History:   The patient's family history is significant for.  Family History   Problem Relation Age of Onset     Cancer Father      Breast Cancer Sister        Physical Exam:   /81 (BP Location: Left arm, Patient Position: Sitting, Cuff Size: Adult Large)   Pulse 63   Temp 98.1  F (36.7  C) (Oral)   Resp 20   Wt 90.3 kg (199 lb)   SpO2 99%   BMI 39.59 kg/m    Body mass index is 39.59 kg/m .    General Appearance: Healthy and alert, no distress        Cardiovascular: Regular rate     Respiratory: Non labored breathing     Genitourinary: External genitalia and urethral meatus appears normal.  Vagina is smooth without nodularity or masses.  Cervix appears normal and without lesions.     Procedure: After patient identification was confirmed and informed consent was reviewed, a bivalve speculum was placed in the vagina  for adequate visualization of the cervix.  The cervix awas prepped with betadine.  The cervix was grasped with a single-tooth tenaculum to apply traction.  Endometrial biopsy was obtained with a single pass of the [place], with moderate tissue obtained.  The tenaculum was removed, and the tenaculum site was hemostatic.  The speculum was then removed.  The patient tolerated the procedure well.  5 minutes was spent on the procedure separate from visit time.       Labs:  None    Assessment:    Simran Maldonado is a 36 year old woman with a diagnosis of EIN.     A total of 30 minutes was spent on patient care today.       Plan:     1.)    EIN-We reviewed the natural history and progression of disease.  Repeat biopsy done today and await results.  Given she no longer wishes to preserve fertility, we discussed my recommendation to proceed with hysterectomy.  She is in agreement but would like to wait for the biopsy results to determine timing.  If biopsy does not show cancer she would like to defer surgery until September as she is still recovering from her recent cholecystectomy.  If malignancy on biopsy she would like to proceed sooner.  I will call her with the biopsy results and to discuss surgery in more detail.     2.) Genetic risk factors were assessed and the patient does not meet the qualifications for a referral.      3.) Labs and/or tests ordered include:  EMB     4.) Health maintenance issues addressed today include pt is up to date.          Thank you for allowing us to participate in the care of your patient.         Sincerely,    Maricarmen Alonso MD  Gynecologic Oncology  [facility]       CC        Oncology Rooming Note    July 29, 2022 8:18 AM   Simran Maldonado is a 36 year old female who presents for:    Chief Complaint   Patient presents with     Oncology Clinic Visit     endometrial intraepithelial neoplasia)     Initial Vitals: /81 (BP Location: Left arm,  "Patient Position: Sitting, Cuff Size: Adult Large)   Pulse 63   Temp 98.1  F (36.7  C) (Oral)   Resp 20   Wt 90.3 kg (199 lb)   SpO2 99%   BMI 39.59 kg/m   Estimated body mass index is 39.59 kg/m  as calculated from the following:    Height as of 4/22/22: 1.51 m (4' 11.45\").    Weight as of this encounter: 90.3 kg (199 lb). Body surface area is 1.95 meters squared.  Data Unavailable Comment: Data Unavailable   No LMP recorded. (Menstrual status: Irregular Periods).  Allergies reviewed: Yes  Medications reviewed: Yes    Medications: Medication refills not needed today.  Pharmacy name entered into haystagg: [id number] [address] AT [facility]    Clinical concerns: no     Sonali Kaplan CMA                  Again, thank you for allowing me to participate in the care of your patient.        Sincerely,        Nolan Alonso MD    "

## 2022-07-29 NOTE — PROGRESS NOTES
"Oncology Rooming Note    July 29, 2022 8:18 AM   Simran Maldonado is a 36 year old female who presents for:    Chief Complaint   Patient presents with     Oncology Clinic Visit     endometrial intraepithelial neoplasia)     Initial Vitals: /81 (BP Location: Left arm, Patient Position: Sitting, Cuff Size: Adult Large)   Pulse 63   Temp 98.1  F (36.7  C) (Oral)   Resp 20   Wt 90.3 kg (199 lb)   SpO2 99%   BMI 39.59 kg/m   Estimated body mass index is 39.59 kg/m  as calculated from the following:    Height as of 4/22/22: 1.51 m (4' 11.45\").    Weight as of this encounter: 90.3 kg (199 lb). Body surface area is 1.95 meters squared.  Data Unavailable Comment: Data Unavailable   No LMP recorded. (Menstrual status: Irregular Periods).  Allergies reviewed: Yes  Medications reviewed: Yes    Medications: Medication refills not needed today.  Pharmacy name entered into [employer] (EBTH): [facility] #[id number] - [place], [place] - [address] AT [facility]    Clinical concerns: no     Sonali Kaplan CMA              "

## 2022-08-01 LAB
PATH REPORT.COMMENTS IMP SPEC: NORMAL
PATH REPORT.COMMENTS IMP SPEC: NORMAL
PATH REPORT.FINAL DX SPEC: NORMAL
PATH REPORT.GROSS SPEC: NORMAL
PATH REPORT.MICROSCOPIC SPEC OTHER STN: NORMAL
PATH REPORT.RELEVANT HX SPEC: NORMAL
PHOTO IMAGE: NORMAL

## 2022-09-12 ENCOUNTER — HOSPITAL ENCOUNTER (OUTPATIENT)
Facility: CLINIC | Age: 37
End: 2022-09-12
Attending: OBSTETRICS & GYNECOLOGY | Admitting: OBSTETRICS & GYNECOLOGY
Payer: COMMERCIAL

## 2022-09-13 ENCOUNTER — DOCUMENTATION ONLY (OUTPATIENT)
Dept: ONCOLOGY | Facility: CLINIC | Age: 37
End: 2022-09-13

## 2022-09-13 NOTE — PROGRESS NOTES
RN Care Coordinator: Amisha Morgan; [phone]    Surgery is scheduled with Dr. Alonso   Date: 9/28   Location: [facility]  Scheduled per surgeon requested      H&P to be completed by Surgeon     COVID-19 test: patient to complete an at-home test 1-2 days prior to scheduled date and bring a picture of negative results or call [phone] option # 7 to schedule a PCR test within 4 days of the scheduled date     Post-op: will be scheduled by the clinic    Patient will receive a phone call from pre-admission nurses 1-2 days prior to surgery with arrival and start time.        RNCC to contact patient. RNCC will contact me if I need to reach out to the patient. No further action needed at this time.         Patient questions/concerns routed to RNCC: N/A          Surgery packet to be provided by the RNCC during appointment

## 2022-09-15 ENCOUNTER — TELEPHONE (OUTPATIENT)
Dept: ONCOLOGY | Facility: CLINIC | Age: 37
End: 2022-09-15

## 2022-09-15 NOTE — TELEPHONE ENCOUNTER
Simran is calling with the help of  services.  Simran is cancelling her appointment for tomorrow 9/16/22 pre-op teaching for surgery.  Simran would also like to cancel surgery scheduling until December when she has time off from work.  Simran also feels that maybe the surgery is to soon after her last surgery.  She also asked that she not be contacted during the day before 3:00 pm.  I have entered this into her permanent comments.  Please call Simran back with the help of  services to talk about her future care.  Thank you,     Chandni New  Lead, Scheduling [facility] [place] [facility] [phone]  [place] [phone]

## 2022-09-21 ENCOUNTER — PATIENT OUTREACH (OUTPATIENT)
Dept: ONCOLOGY | Facility: CLINIC | Age: 37
End: 2022-09-21

## 2022-09-21 NOTE — PROGRESS NOTES
Left message for Simran to call back to discuss dates in December when she would like to schedule surgery with Dr. Alonso.  We can get this on the books and get her set up for Pre Op prior to surgery.  Request Simran call me back directly [phone].    Thanks    Amisha Morgan RN Care Coordinator  [facility]  Ph.[phone] Fax. [phone]

## 2022-11-21 ENCOUNTER — PATIENT OUTREACH (OUTPATIENT)
Dept: ONCOLOGY | Facility: CLINIC | Age: 37
End: 2022-11-21

## 2022-11-21 NOTE — PROGRESS NOTES
Made call out with help of  had to leave voicemail - inquiring when Simran would like to schedule her surgery with Dr. Alonso in December.  Requested patient call me back directly at [phone].    Thanks    Amisha Morgan RN Care Coordinator  [facility]  Ph.[phone] Fax. [phone]

## 2022-12-12 ENCOUNTER — PATIENT OUTREACH (OUTPATIENT)
Dept: ONCOLOGY | Facility: CLINIC | Age: 37
End: 2022-12-12

## 2022-12-12 NOTE — PROGRESS NOTES
Sent letter to Simran - in regards to scheduling surgery with Dr. Alonso.  We have attempted to contact her several times to reschedule - last conversation she wanted to wait until December.  Hoping patient will call to reschedule follow up.    Thanks    Amisha Morgan RN Care Coordinator  [facility]  Ph.[phone] Fax. [phone]

## 2023-01-10 ENCOUNTER — PATIENT OUTREACH (OUTPATIENT)
Dept: ONCOLOGY | Facility: CLINIC | Age: 38
End: 2023-01-10

## 2023-01-10 NOTE — PROGRESS NOTES
Left message for Simran that Dr. Alonso would like to have follow up visit with her to discuss surgery that she recommended.  I am also sending letter in the mail since I have made several attempts to contact her by phone with no return calls.    Thanks    Amisha Morgan RN Care Coordinator  [facility]  Ph.[phone] Fax. [phone]

## 2023-05-10 NOTE — PATIENT INSTRUCTIONS
I will call patient with results and further schedule once results available  
Detail Level: Detailed

## 2023-12-05 NOTE — ANESTHESIA POSTPROCEDURE EVALUATION
Patient: Simran Maldonado    Procedure(s):  OPERATIVE HYSTEROSCOPY WITH INTRAUTERINE MORCELLATOR, DILATION AND CURETTAGE    Diagnosis:Abnormal uterine bleeding [N93.9]  Thickened endometrium [R93.89]  Diagnosis Additional Information: No value filed.    Anesthesia Type:  General    Note:  Anesthesia Post Evaluation    Patient location during evaluation: PACU  Patient participation: Able to fully participate in evaluation  Level of consciousness: awake  Pain management: adequate  Airway patency: patent  Cardiovascular status: acceptable  Respiratory status: acceptable  Hydration status: acceptable  PONV: controlled     Anesthetic complications: None          Last vitals:  Vitals:    12/08/20 0930 12/08/20 1026 12/08/20 1055   BP: 118/74 111/76 110/74   Pulse: 67  78   Resp: 10 15 16   Temp: 98.2  F (36.8  C) 97.8  F (36.6  C) 97.3  F (36.3  C)   SpO2: 98% 96% 97%         Electronically Signed By: Lamont Beckham MD  December 8, 2020  1:51 PM   Otc Regimen: La Roche Posay body wash\\nMoisturize immediately after showers\\nStart Amlactin lotion QD at bedtime Detail Level: Zone Continue Regimen: Doxycycline 100mg QD for an additional six weeks then discontinue

## 2024-11-12 ENCOUNTER — PRE VISIT (OUTPATIENT)
Dept: ONCOLOGY | Facility: CLINIC | Age: 39
End: 2024-11-12

## 2024-11-12 ENCOUNTER — TRANSCRIBE ORDERS (OUTPATIENT)
Dept: OTHER | Age: 39
End: 2024-11-12

## 2024-11-12 DIAGNOSIS — N85.02 EIN (ENDOMETRIAL INTRAEPITHELIAL NEOPLASIA): Primary | ICD-10-CM

## 2024-11-12 NOTE — TELEPHONE ENCOUNTER
RECORDS STATUS - ALL OTHER DIAGNOSIS      RECORDS RECEIVED FROM: [facility]    Appt Date: TBD NN WQ    EIN (endometrial intraepithelial neoplasia)   Action    Action Taken 11/12/2024 4:05pm AVERY SANCHEZ faxed over a request for imaging to .       NOTES STATUS DETAILS   OFFICE NOTE from referring provider  Kristel Bowles MD    OFFICE NOTE from medical oncologist     DISCHARGE SUMMARY from hospital     DISCHARGE REPORT from the ER     OPERATIVE REPORT     MEDICATION LIST In EPIC    CLINICAL TRIAL TREATMENTS TO DATE     LABS     PATHOLOGY REPORTS External biopsy  10/24/2024   Case: [id number]     A.  Endometrium, biopsy:    Focal crowding consistent with history of endometrioid intraepithelial neoplasia   ANYTHING RELATED TO DIAGNOSIS In CE Labs last updated on 10/24/2024   PATHOLOGY [facility] TRACKING   TRACKING #:   GENONOMIC TESTING     TYPE:     IMAGING (NEED IMAGES & REPORT)     CT SCANS     MRI     XRAYS Requested-   Xray Abdomen 7/2022   ULTRASOUND Requested-   US GYN 10/24/2024    US Abdomen  Requested-   6/2022    ULT Pelvic 1/2022   IMAGE DISC FEDEX TRACKING   TRACKING #:

## (undated) DEVICE — SEAL SET MYOSURE ROD LENS SCOPE SINGLE USE 40-902

## (undated) DEVICE — BAG CLEAR TRASH 1.3M 39X33" P4040C

## (undated) DEVICE — LINEN HALF SHEET 5512

## (undated) DEVICE — LINEN DRAPE 54X72" 5467

## (undated) DEVICE — SOL NACL 0.9% IRRIG 3000ML BAG 2B7477

## (undated) DEVICE — DRAPE UNDER BUTTOCK 89415

## (undated) DEVICE — PACK MINOR LITHOTOMY RIDGES

## (undated) DEVICE — LINEN FULL SHEET 5511

## (undated) DEVICE — Device

## (undated) DEVICE — LINEN TOWEL PACK X10 5473

## (undated) DEVICE — SOL NACL 0.9% IRRIG 1000ML BOTTLE 2F7124

## (undated) DEVICE — BASIN SET MINOR DISP

## (undated) DEVICE — CATH INTERMITTENT CLEAN-CATH FEMALE 14FR 6" VINYL LF 420614

## (undated) DEVICE — SPECIMEN BAG BEMIS HI FLOW SUCTION WHITE SOCK 533810

## (undated) DEVICE — GLOVE PROTEXIS BLUE W/NEU-THERA 6.5  2D73EB65

## (undated) DEVICE — SUCTION CANISTER BEMIS HI FLOW 006772-901

## (undated) DEVICE — TUBING SYS AQUILEX BLUE INFLOW AQL-110 YLW OUTFLOW AQL-111

## (undated) RX ORDER — LIDOCAINE HYDROCHLORIDE 10 MG/ML
INJECTION, SOLUTION EPIDURAL; INFILTRATION; INTRACAUDAL; PERINEURAL
Status: DISPENSED
Start: 2020-12-08

## (undated) RX ORDER — DEXAMETHASONE SODIUM PHOSPHATE 4 MG/ML
INJECTION, SOLUTION INTRA-ARTICULAR; INTRALESIONAL; INTRAMUSCULAR; INTRAVENOUS; SOFT TISSUE
Status: DISPENSED
Start: 2020-12-08

## (undated) RX ORDER — SCOLOPAMINE TRANSDERMAL SYSTEM 1 MG/1
PATCH, EXTENDED RELEASE TRANSDERMAL
Status: DISPENSED
Start: 2020-12-08

## (undated) RX ORDER — ONDANSETRON 2 MG/ML
INJECTION INTRAMUSCULAR; INTRAVENOUS
Status: DISPENSED
Start: 2020-12-08

## (undated) RX ORDER — CEFAZOLIN SODIUM 2 G/100ML
INJECTION, SOLUTION INTRAVENOUS
Status: DISPENSED
Start: 2020-12-08

## (undated) RX ORDER — PROPOFOL 10 MG/ML
INJECTION, EMULSION INTRAVENOUS
Status: DISPENSED
Start: 2020-12-08

## (undated) RX ORDER — FENTANYL CITRATE 50 UG/ML
INJECTION, SOLUTION INTRAMUSCULAR; INTRAVENOUS
Status: DISPENSED
Start: 2020-12-08

## (undated) RX ORDER — KETOROLAC TROMETHAMINE 30 MG/ML
INJECTION, SOLUTION INTRAMUSCULAR; INTRAVENOUS
Status: DISPENSED
Start: 2020-12-08